# Patient Record
Sex: MALE | Race: WHITE | Employment: OTHER | URBAN - METROPOLITAN AREA
[De-identification: names, ages, dates, MRNs, and addresses within clinical notes are randomized per-mention and may not be internally consistent; named-entity substitution may affect disease eponyms.]

---

## 2018-01-29 ENCOUNTER — OFFICE VISIT (OUTPATIENT)
Dept: INTERNAL MEDICINE CLINIC | Age: 58
End: 2018-01-29

## 2018-01-29 VITALS
WEIGHT: 264 LBS | TEMPERATURE: 97.5 F | DIASTOLIC BLOOD PRESSURE: 54 MMHG | SYSTOLIC BLOOD PRESSURE: 100 MMHG | BODY MASS INDEX: 37.8 KG/M2 | HEART RATE: 92 BPM | HEIGHT: 70 IN | RESPIRATION RATE: 16 BRPM | OXYGEN SATURATION: 98 %

## 2018-01-29 DIAGNOSIS — E66.9 OBESITY (BMI 30-39.9): ICD-10-CM

## 2018-01-29 DIAGNOSIS — F79 MENTAL RETARDATION: ICD-10-CM

## 2018-01-29 DIAGNOSIS — Z00.00 ROUTINE GENERAL MEDICAL EXAMINATION AT A HEALTH CARE FACILITY: Primary | ICD-10-CM

## 2018-01-29 NOTE — PROGRESS NOTES
Chief Complaint   Patient presents with   [facility] Establish Care     Yearly physical and establish care with Dr. Pj Johnson. ROOM 10       Health Maintenance Due   Topic Date Due    Hepatitis C Screening  1960    DTaP/Tdap/Td series (1 - Tdap) 12/25/1981    FOBT Q 1 YEAR AGE 50-75  12/25/2010    Influenza Age 5 to Adult  08/01/2017     Patient's mother states he has had his influenza vaccine at [facility] in November 2017. 1. Have you been to the ER, urgent care clinic or hospitalized since your last visit? NO.     2. Have you seen or consulted any other health care providers outside of the [address] [place] [address] since your last visit (Include any pap smears or colon screening)? YES, Patient's mother states he is seen by a physiatrist every 3 months. Do you have an Advanced Directive? NO    Would you like information on Advanced Directives?  NO

## 2018-01-29 NOTE — PROGRESS NOTES
Jeff Power 1960, is a 62 y.o. male, who is seen today for a routine physical exam having not been seen here for more than 4 years. He lives with his mother and has mental retardation. He does chores every day and is pleasant at home. He sees a psychiatrist for continued use of medication. He has not been able to cut back and lose weight, he has lost weight down to about 235 but is now back up to where he was 4 years ago with another 6 pounds beyond that. His mother thinks he is doing great,  he feels well. Past Medical History:   Diagnosis Date    Mental retardation     Overweight(278.02)      History reviewed. No pertinent surgical history. Current Outpatient Prescriptions   Medication Sig Dispense Refill    thioridazine (MELLARIL) 50 mg tablet 3 tablets in the morning and 2 tablets in the evening 150 Tab 0    amitriptyline (ELAVIL) 100 mg tablet Take 1 Tab by mouth nightly. 30 Tab 0     No Known Allergies  Social History     Social History    Marital status: SINGLE     Spouse name: N/A    Number of children: N/A    Years of education: N/A     Social History Main Topics    Smoking status: Never Smoker    Smokeless tobacco: Never Used    Alcohol use No    Drug use: No    Sexual activity: No     Other Topics Concern    None     Social History Narrative     Visit Vitals    /54 (BP 1 Location: Left arm, BP Patient Position: Sitting)    Pulse 92    Temp 97.5 °F (36.4 °C) (Oral)    Resp 16    Ht 5' 10\" (1.778 m)    Wt 264 lb (119.7 kg)    SpO2 98%    BMI 37.88 kg/m2     Ear canals and tympanic membranes appear normal.  Oral cavity reveals no lesions. Neck reveals no adenopathy or thyromegaly. Carotids are 2+ without bruits. Lungs are clear to percussion. Good breath sounds with no wheezing or crackles. Heart reveals a regular rhythm with normal S1 and S2 no murmur gallop click or rub. Apical impulse is not palpable.   Abdomen is obese soft nontender with no obvious hepatosplenomegaly or masses. No bruits. Extremities reveal no clubbing cyanosis or edema. Pulses are 2+. Lab at the psychiatric office is normal.    Assessment: Mental retardation but doing well otherwise. He is obese but suffering no complications of obesity. I have recommended he work on losing perhaps 30 pounds over the next year or so. He has been able to do that in the past.  He and his mother will be working on that. Follow-up as needed    Ga Viveros. Stef Lindsay MD FACP    Please note: This document has been produced using voice recognition software. Unrecognized errors in transcription may be present.

## 2018-09-01 ENCOUNTER — APPOINTMENT (OUTPATIENT)
Dept: CT IMAGING | Age: 58
End: 2018-09-01
Attending: PHYSICIAN ASSISTANT
Payer: MEDICARE

## 2018-09-01 ENCOUNTER — HOSPITAL ENCOUNTER (EMERGENCY)
Age: 58
Discharge: ANOTHER HOSPITAL | End: 2018-09-01
Attending: EMERGENCY MEDICINE | Admitting: EMERGENCY MEDICINE
Payer: MEDICARE

## 2018-09-01 ENCOUNTER — APPOINTMENT (OUTPATIENT)
Dept: GENERAL RADIOLOGY | Age: 58
End: 2018-09-01
Attending: EMERGENCY MEDICINE
Payer: MEDICARE

## 2018-09-01 VITALS
OXYGEN SATURATION: 97 % | TEMPERATURE: 98.8 F | BODY MASS INDEX: 37.45 KG/M2 | SYSTOLIC BLOOD PRESSURE: 119 MMHG | HEART RATE: 86 BPM | WEIGHT: 261 LBS | RESPIRATION RATE: 29 BRPM | DIASTOLIC BLOOD PRESSURE: 66 MMHG

## 2018-09-01 DIAGNOSIS — I26.99 OTHER ACUTE PULMONARY EMBOLISM WITHOUT ACUTE COR PULMONALE (HCC): ICD-10-CM

## 2018-09-01 DIAGNOSIS — R56.1 POST TRAUMATIC SEIZURE (HCC): ICD-10-CM

## 2018-09-01 DIAGNOSIS — R77.8 ELEVATED TROPONIN: ICD-10-CM

## 2018-09-01 DIAGNOSIS — R55 SYNCOPE AND COLLAPSE: Primary | ICD-10-CM

## 2018-09-01 DIAGNOSIS — S06.329A: ICD-10-CM

## 2018-09-01 LAB
ABO + RH BLD: NORMAL
ALBUMIN SERPL-MCNC: 3.3 G/DL (ref 3.4–5)
ALBUMIN SERPL-MCNC: 3.5 G/DL (ref 3.4–5)
ALBUMIN/GLOB SERPL: 0.9 {RATIO} (ref 0.8–1.7)
ALBUMIN/GLOB SERPL: 0.9 {RATIO} (ref 0.8–1.7)
ALP SERPL-CCNC: 113 U/L (ref 45–117)
ALP SERPL-CCNC: 120 U/L (ref 45–117)
ALT SERPL-CCNC: 61 U/L (ref 16–61)
ALT SERPL-CCNC: 62 U/L (ref 16–61)
ANION GAP SERPL CALC-SCNC: 11 MMOL/L (ref 3–18)
ANION GAP SERPL CALC-SCNC: 8 MMOL/L (ref 3–18)
APPEARANCE UR: CLEAR
APTT PPP: 23.4 SEC (ref 23–36.4)
ARTERIAL PATENCY WRIST A: ABNORMAL
AST SERPL-CCNC: 23 U/L (ref 15–37)
AST SERPL-CCNC: 26 U/L (ref 15–37)
BASE DEFICIT BLD-SCNC: 1 MMOL/L
BASOPHILS # BLD: 0 K/UL (ref 0–0.06)
BASOPHILS NFR BLD: 0 % (ref 0–3)
BDY SITE: ABNORMAL
BILIRUB SERPL-MCNC: 0.4 MG/DL (ref 0.2–1)
BILIRUB SERPL-MCNC: 0.5 MG/DL (ref 0.2–1)
BILIRUB UR QL: NEGATIVE
BLOOD GROUP ANTIBODIES SERPL: NORMAL
BODY TEMPERATURE: 37.1
BUN SERPL-MCNC: 15 MG/DL (ref 7–18)
BUN SERPL-MCNC: 16 MG/DL (ref 7–18)
BUN/CREAT SERPL: 13 (ref 12–20)
BUN/CREAT SERPL: 14 (ref 12–20)
CALCIUM SERPL-MCNC: 8.6 MG/DL (ref 8.5–10.1)
CALCIUM SERPL-MCNC: 9 MG/DL (ref 8.5–10.1)
CHLORIDE SERPL-SCNC: 103 MMOL/L (ref 100–108)
CHLORIDE SERPL-SCNC: 104 MMOL/L (ref 100–108)
CK MB CFR SERPL CALC: 1.7 % (ref 0–4)
CK MB CFR SERPL CALC: 2.1 % (ref 0–4)
CK MB SERPL-MCNC: 1 NG/ML (ref 5–25)
CK MB SERPL-MCNC: 1.5 NG/ML (ref 5–25)
CK SERPL-CCNC: 58 U/L (ref 39–308)
CK SERPL-CCNC: 70 U/L (ref 39–308)
CO2 SERPL-SCNC: 24 MMOL/L (ref 21–32)
CO2 SERPL-SCNC: 27 MMOL/L (ref 21–32)
COLOR UR: YELLOW
CREAT SERPL-MCNC: 1.12 MG/DL (ref 0.6–1.3)
CREAT SERPL-MCNC: 1.16 MG/DL (ref 0.6–1.3)
DIFFERENTIAL METHOD BLD: ABNORMAL
EOSINOPHIL # BLD: 0.2 K/UL (ref 0–0.4)
EOSINOPHIL NFR BLD: 1 % (ref 0–5)
ERYTHROCYTE [DISTWIDTH] IN BLOOD BY AUTOMATED COUNT: 12.3 % (ref 11.6–14.5)
ERYTHROCYTE [DISTWIDTH] IN BLOOD BY AUTOMATED COUNT: 12.3 % (ref 11.6–14.5)
GAS FLOW.O2 O2 DELIVERY SYS: ABNORMAL L/MIN
GLOBULIN SER CALC-MCNC: 3.7 G/DL (ref 2–4)
GLOBULIN SER CALC-MCNC: 3.8 G/DL (ref 2–4)
GLUCOSE SERPL-MCNC: 130 MG/DL (ref 74–99)
GLUCOSE SERPL-MCNC: 136 MG/DL (ref 74–99)
GLUCOSE UR STRIP.AUTO-MCNC: NEGATIVE MG/DL
HCO3 BLD-SCNC: 23.2 MMOL/L (ref 22–26)
HCT VFR BLD AUTO: 35.5 % (ref 36–48)
HCT VFR BLD AUTO: 38.8 % (ref 36–48)
HGB BLD-MCNC: 12.4 G/DL (ref 13–16)
HGB BLD-MCNC: 13.4 G/DL (ref 13–16)
HGB UR QL STRIP: NEGATIVE
INR PPP: 1.1 (ref 0.8–1.2)
KETONES UR QL STRIP.AUTO: ABNORMAL MG/DL
LEUKOCYTE ESTERASE UR QL STRIP.AUTO: NEGATIVE
LYMPHOCYTES # BLD: 2.3 K/UL (ref 0.8–3.5)
LYMPHOCYTES NFR BLD: 14 % (ref 20–51)
MAGNESIUM SERPL-MCNC: 2.3 MG/DL (ref 1.6–2.6)
MCH RBC QN AUTO: 29.9 PG (ref 24–34)
MCH RBC QN AUTO: 30 PG (ref 24–34)
MCHC RBC AUTO-ENTMCNC: 34.5 G/DL (ref 31–37)
MCHC RBC AUTO-ENTMCNC: 34.9 G/DL (ref 31–37)
MCV RBC AUTO: 86 FL (ref 74–97)
MCV RBC AUTO: 86.6 FL (ref 74–97)
MONOCYTES # BLD: 1.7 K/UL (ref 0–1)
MONOCYTES NFR BLD: 10 % (ref 2–9)
NEUTS BAND NFR BLD MANUAL: 2 % (ref 0–5)
NEUTS SEG # BLD: 12.3 K/UL (ref 1.8–8)
NEUTS SEG NFR BLD: 73 % (ref 42–75)
NITRITE UR QL STRIP.AUTO: NEGATIVE
O2/TOTAL GAS SETTING VFR VENT: 21 %
PCO2 BLD: 37.2 MMHG (ref 35–45)
PH BLD: 7.4 [PH] (ref 7.35–7.45)
PH UR STRIP: 7 [PH] (ref 5–8)
PLATELET # BLD AUTO: 116 K/UL (ref 135–420)
PLATELET # BLD AUTO: 154 K/UL (ref 135–420)
PLATELET COMMENTS,PCOM: ABNORMAL
PMV BLD AUTO: 10.4 FL (ref 9.2–11.8)
PMV BLD AUTO: 11.2 FL (ref 9.2–11.8)
PO2 BLD: 58 MMHG (ref 80–100)
POTASSIUM SERPL-SCNC: 4.4 MMOL/L (ref 3.5–5.5)
POTASSIUM SERPL-SCNC: 4.6 MMOL/L (ref 3.5–5.5)
PROT SERPL-MCNC: 7 G/DL (ref 6.4–8.2)
PROT SERPL-MCNC: 7.3 G/DL (ref 6.4–8.2)
PROT UR STRIP-MCNC: NEGATIVE MG/DL
PROTHROMBIN TIME: 14 SEC (ref 11.5–15.2)
RBC # BLD AUTO: 4.13 M/UL (ref 4.7–5.5)
RBC # BLD AUTO: 4.48 M/UL (ref 4.7–5.5)
RBC MORPH BLD: ABNORMAL
RBC MORPH BLD: ABNORMAL
SAO2 % BLD: 90 % (ref 92–97)
SERVICE CMNT-IMP: ABNORMAL
SODIUM SERPL-SCNC: 138 MMOL/L (ref 136–145)
SODIUM SERPL-SCNC: 139 MMOL/L (ref 136–145)
SP GR UR REFRACTOMETRY: >1.03 (ref 1–1.03)
SPECIMEN EXP DATE BLD: NORMAL
SPECIMEN TYPE: ABNORMAL
TOTAL RESP. RATE, ITRR: 23
TROPONIN I SERPL-MCNC: 0.04 NG/ML (ref 0–0.04)
TROPONIN I SERPL-MCNC: 0.12 NG/ML (ref 0–0.04)
UROBILINOGEN UR QL STRIP.AUTO: 0.2 EU/DL (ref 0.2–1)
WBC # BLD AUTO: 16.5 K/UL (ref 4.6–13.2)
WBC # BLD AUTO: 7.5 K/UL (ref 4.6–13.2)

## 2018-09-01 PROCEDURE — 96367 TX/PROPH/DG ADDL SEQ IV INF: CPT

## 2018-09-01 PROCEDURE — 77030018836 HC SOL IRR NACL ICUM -A

## 2018-09-01 PROCEDURE — 86900 BLOOD TYPING SEROLOGIC ABO: CPT | Performed by: PHYSICIAN ASSISTANT

## 2018-09-01 PROCEDURE — 74011250636 HC RX REV CODE- 250/636: Performed by: PHYSICIAN ASSISTANT

## 2018-09-01 PROCEDURE — 83735 ASSAY OF MAGNESIUM: CPT | Performed by: PHYSICIAN ASSISTANT

## 2018-09-01 PROCEDURE — 80053 COMPREHEN METABOLIC PANEL: CPT | Performed by: EMERGENCY MEDICINE

## 2018-09-01 PROCEDURE — 36600 WITHDRAWAL OF ARTERIAL BLOOD: CPT

## 2018-09-01 PROCEDURE — 85730 THROMBOPLASTIN TIME PARTIAL: CPT | Performed by: PHYSICIAN ASSISTANT

## 2018-09-01 PROCEDURE — 93005 ELECTROCARDIOGRAM TRACING: CPT

## 2018-09-01 PROCEDURE — 77030008460 HC STPLR SKN PRECIS 3M -A

## 2018-09-01 PROCEDURE — 82550 ASSAY OF CK (CPK): CPT | Performed by: EMERGENCY MEDICINE

## 2018-09-01 PROCEDURE — 85025 COMPLETE CBC W/AUTO DIFF WBC: CPT | Performed by: PHYSICIAN ASSISTANT

## 2018-09-01 PROCEDURE — 75810000293 HC SIMP/SUPERF WND  RPR

## 2018-09-01 PROCEDURE — 85027 COMPLETE CBC AUTOMATED: CPT | Performed by: EMERGENCY MEDICINE

## 2018-09-01 PROCEDURE — 71045 X-RAY EXAM CHEST 1 VIEW: CPT

## 2018-09-01 PROCEDURE — 74011636320 HC RX REV CODE- 636/320: Performed by: EMERGENCY MEDICINE

## 2018-09-01 PROCEDURE — 81003 URINALYSIS AUTO W/O SCOPE: CPT | Performed by: EMERGENCY MEDICINE

## 2018-09-01 PROCEDURE — 80053 COMPREHEN METABOLIC PANEL: CPT | Performed by: PHYSICIAN ASSISTANT

## 2018-09-01 PROCEDURE — 82803 BLOOD GASES ANY COMBINATION: CPT

## 2018-09-01 PROCEDURE — 71275 CT ANGIOGRAPHY CHEST: CPT

## 2018-09-01 PROCEDURE — 96365 THER/PROPH/DIAG IV INF INIT: CPT

## 2018-09-01 PROCEDURE — 70450 CT HEAD/BRAIN W/O DYE: CPT

## 2018-09-01 PROCEDURE — 85610 PROTHROMBIN TIME: CPT | Performed by: PHYSICIAN ASSISTANT

## 2018-09-01 PROCEDURE — 99285 EMERGENCY DEPT VISIT HI MDM: CPT

## 2018-09-01 RX ORDER — HEPARIN SODIUM 10000 [USP'U]/100ML
18-36 INJECTION, SOLUTION INTRAVENOUS
Status: DISCONTINUED | OUTPATIENT
Start: 2018-09-01 | End: 2018-09-02 | Stop reason: HOSPADM

## 2018-09-01 RX ORDER — HEPARIN SODIUM 1000 [USP'U]/ML
80 INJECTION, SOLUTION INTRAVENOUS; SUBCUTANEOUS ONCE
Status: COMPLETED | OUTPATIENT
Start: 2018-09-01 | End: 2018-09-01

## 2018-09-01 RX ORDER — LEVETIRACETAM 5 MG/ML
1000 INJECTION INTRAVASCULAR EVERY 12 HOURS
Status: DISCONTINUED | OUTPATIENT
Start: 2018-09-01 | End: 2018-09-02 | Stop reason: HOSPADM

## 2018-09-01 RX ADMIN — IOPAMIDOL 50 ML: 755 INJECTION, SOLUTION INTRAVENOUS at 16:31

## 2018-09-01 RX ADMIN — HEPARIN SODIUM AND DEXTROSE 18 UNITS/KG/HR: 10000; 5 INJECTION INTRAVENOUS at 19:27

## 2018-09-01 RX ADMIN — LEVETIRACETAM 1000 MG: 5 INJECTION INTRAVENOUS at 13:45

## 2018-09-01 RX ADMIN — SODIUM CHLORIDE 1000 ML: 900 INJECTION, SOLUTION INTRAVENOUS at 13:45

## 2018-09-01 RX ADMIN — HEPARIN SODIUM 9470 UNITS: 1000 INJECTION INTRAVENOUS; SUBCUTANEOUS at 19:25

## 2018-09-01 NOTE — DISCHARGE INSTRUCTIONS
Cuts: Care Instructions  Your Care Instructions  A cut can happen anywhere on your body. Stitches, staples, skin adhesives, or pieces of tape called Steri-Strips are sometimes used to keep the edges of a cut together and help it heal. Steri-Strips can be used by themselves or with stitches or staples. Sometimes cuts are left open. If the cut went deep and through the skin, the doctor may have closed the cut in two layers. A deeper layer of stitches brings the deep part of the cut together. These stitches will dissolve and don't need to be removed. The upper layer closure, which could be stitches, staples, Steri-Strips, or adhesive, is what you see on the cut. A cut is often covered by a bandage. The doctor has checked you carefully, but problems can develop later. If you notice any problems or new symptoms, get medical treatment right away. Follow-up care is a key part of your treatment and safety. Be sure to make and go to all appointments, and call your doctor if you are having problems. It's also a good idea to know your test results and keep a list of the medicines you take. How can you care for yourself at home? If a cut is open or closed  · Prop up the sore area on a pillow anytime you sit or lie down during the next 3 days. Try to keep it above the level of your heart. This will help reduce swelling. · Keep the cut dry for the first 24 to 48 hours. After this, you can shower if your doctor okays it. Pat the cut dry. · Don't soak the cut, such as in a bathtub. Your doctor will tell you when it's safe to get the cut wet. · After the first 24 to 48 hours, clean the cut with soap and water 2 times a day unless your doctor gives you different instructions. ¨ Don't use hydrogen peroxide or alcohol, which can slow healing. ¨ You may cover the cut with a thin layer of petroleum jelly and a nonstick bandage.   ¨ If the doctor put a bandage over the cut, put on a new bandage after cleaning the cut or if the bandage gets wet or dirty. · Avoid any activity that could cause your cut to reopen. · Be safe with medicines. Read and follow all instructions on the label. ¨ If the doctor gave you a prescription medicine for pain, take it as prescribed. ¨ If you are not taking a prescription pain medicine, ask your doctor if you can take an over-the-counter medicine. If the cut is closed with stitches, staples, or Steri-Strips  · Follow the above instructions for open or closed cuts. · Do not remove the stitches or staples on your own. Your doctor will tell you when to come back to have the stitches or staples removed. · Leave Steri-Strips on until they fall off. If the cut is closed with a skin adhesive  · Follow the above instructions for open or closed cuts. · Leave the skin adhesive on your skin until it falls off on its own. This may take 5 to 10 days. · Do not scratch, rub, or pick at the adhesive. · Do not put the sticky part of a bandage directly on the adhesive. · Do not put any kind of ointment, cream, or lotion over the area. This can make the adhesive fall off too soon. Do not use hydrogen peroxide or alcohol, which can slow healing. When should you call for help? Call your doctor now or seek immediate medical care if:    · You have new pain, or your pain gets worse.     · The skin near the cut is cold or pale or changes color.     · You have tingling, weakness, or numbness near the cut.     · The cut starts to bleed, and blood soaks through the bandage. Oozing small amounts of blood is normal.     · You have trouble moving the area near the cut.     · You have symptoms of infection, such as:  ¨ Increased pain, swelling, warmth, or redness around the cut. ¨ Red streaks leading from the cut. ¨ Pus draining from the cut. ¨ A fever.    Watch closely for changes in your health, and be sure to contact your doctor if:    · The cut reopens.     · You do not get better as expected.    Where can you learn more? Go to http://severino-curtis.info/. Enter M735 in the search box to learn more about \"Cuts: Care Instructions. \"  Current as of: November 20, 2017  Content Version: 11.7  © 2360-0916 [employer]. Care instructions adapted under license by [employer] (which disclaims liability or warranty for this information). If you have questions about a medical condition or this instruction, always ask your healthcare professional. [address] any warranty or liability for your use of this information.

## 2018-09-01 NOTE — ED NOTES
Patient able to stand and ambulate with no complications. Pt had laceration covered with appropriate bandage. Pt and family given d./c papers and had no questions @ this time.

## 2018-09-01 NOTE — ED NOTES
TRANSFER - OUT REPORT: 
 
Verbal report given to Sharlet Rubinstein, RN(name) on Marivel Flynn  being transferred to [facility]) for routine progression of care Report consisted of patients Situation, Background, Assessment and  
Recommendations(SBAR). Information from the following report(s) SBAR, ED Summary, Intake/Output, MAR, Recent Results and Cardiac Rhythm NSR was reviewed with the receiving nurse. Opportunity for questions and clarification was provided. Patient transported with: 
[facility] transport

## 2018-09-01 NOTE — ED TRIAGE NOTES
Pt. Arrived via medic. Pt walking outside and got dizzy, fell. Pt has laceration on R side of head. Controlled bleeding @ this time. Pt AO x4. Hx of MR.

## 2018-09-01 NOTE — ED PROVIDER NOTES
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 Details not available. PMHx Several falls in past. 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 Meds Elavil HS, Mellaril 3 in AM 2 HS Patient is a 62 y.o. male presenting with fall. The history is provided by the patient and a parent. The history is limited by a developmental delay. Fall The accident occurred less than 1 hour ago. The fall occurred while standing and while walking. He landed on concrete. Past Medical History:  
Diagnosis Date  Mental retardation  Overweight(278.02) History reviewed. No pertinent surgical history. History reviewed. No pertinent family history. Social History Social History  Marital status: SINGLE Spouse name: N/A  
 Number of children: N/A  
 Years of education: N/A Occupational History  Not on file. Social History Main Topics  Smoking status: Never Smoker  Smokeless tobacco: Never Used  Alcohol use No  
 Drug use: No  
 Sexual activity: No  
 
Other Topics Concern  Not on file Social History Narrative ALLERGIES: Review of patient's allergies indicates no known allergies. Review of Systems Constitutional: Negative. HENT: Negative. Laceration forehead. Eyes: Negative. Respiratory: Negative. Cardiovascular: Negative. Gastrointestinal: Negative. Endocrine: Negative. Genitourinary: Negative. Musculoskeletal: Negative. Skin: Negative. Allergic/Immunologic: Negative. Neurological: Negative. Hematological: Negative. Psychiatric/Behavioral: Negative. Vitals:  
 09/01/18 1035 09/01/18 1036 09/01/18 1040 09/01/18 1040 BP:      
Pulse: (!) 105 (!) 103 Resp: 23 22 Temp:    98.8 °F (37.1 °C) SpO2: (!) 89% (!) 89% 93% Physical Exam  
Constitutional: He is oriented to person, place, and time. He appears well-developed and well-nourished. No distress. HENT:  
Head: Normocephalic. Two small lacerations forehead. JAS EOM's OK Eyes: Conjunctivae and EOM are normal. Pupils are equal, round, and reactive to light. Right eye exhibits no discharge. Left eye exhibits no discharge. No scleral icterus. Neck: Normal range of motion. No JVD present. No tracheal deviation present. No thyromegaly present. Cardiovascular: Normal rate, regular rhythm and normal heart sounds. No murmur heard. Pulmonary/Chest: Effort normal. No stridor. No respiratory distress. He has wheezes. He has no rales. He exhibits no tenderness. Remote Hx asthma. Abdominal: Soft. Bowel sounds are normal. He exhibits no distension and no mass. There is no tenderness. There is no rebound and no guarding. Musculoskeletal: Normal range of motion. He exhibits no edema, tenderness or deformity. Lymphadenopathy:  
  He has no cervical adenopathy. Neurological: He is oriented to person, place, and time. No cranial nerve deficit. Coordination normal.  
Skin: No rash noted. He is not diaphoretic. No erythema. No pallor. Psychiatric: He has a normal mood and affect. MDM Number of Diagnoses or Management Options Birth injury to central nervous system:  
Facial laceration, initial encounter:  
Fall, initial encounter:  
Diagnosis management comments: Imp: Unknown faint vs fall?? Prior falls by family Hx. ED Course Wound Repair 
Date/Time: 9/1/2018 11:05 AM 
Performed by: attendingPreparation: skin prepped with Betadine Location details: face Wound length:2.5 cm or less Anesthesia: local infiltration Anesthesia: 
Local Anesthetic: lidocaine 1% without epinephrine Anesthetic total: 5 mL Foreign bodies: no foreign bodies Irrigation solution: saline Irrigation method: syringe Debridement: none Skin closure: staples (Stap(les #$) Wound subcutaneous closure material used: none. Number of sutures: 7 (# staples) Technique: simple Approximation: close Dressing: antibiotic ointment and gauze roll My total time at bedside, performing this procedure was 1-15 minutes. EKG RSR rate 108 Normal axis, Intervals and S seg. Recent Results (from the past 12 hour(s)) CARDIAC PANEL,(CK, CKMB & TROPONIN) Collection Time: 09/01/18 11:00 AM  
Result Value Ref Range CK 58 39 - 308 U/L  
 CK - MB 1.0 <3.6 ng/ml CK-MB Index 1.7 0.0 - 4.0 % Troponin-I, Qt. 0.04 0.0 - 0.045 NG/ML  
CBC W/O DIFF Collection Time: 09/01/18 11:00 AM  
Result Value Ref Range WBC 7.5 4.6 - 13.2 K/uL  
 RBC 4.13 (L) 4.70 - 5.50 M/uL  
 HGB 12.4 (L) 13.0 - 16.0 g/dL HCT 35.5 (L) 36.0 - 48.0 % MCV 86.0 74.0 - 97.0 FL  
 MCH 30.0 24.0 - 34.0 PG  
 MCHC 34.9 31.0 - 37.0 g/dL  
 RDW 12.3 11.6 - 14.5 % PLATELET 774 (L) 203 - 420 K/uL MPV 10.4 9.2 - 43.8 FL  
METABOLIC PANEL, COMPREHENSIVE Collection Time: 09/01/18 11:00 AM  
Result Value Ref Range Sodium 138 136 - 145 mmol/L Potassium 4.4 3.5 - 5.5 mmol/L Chloride 103 100 - 108 mmol/L  
 CO2 27 21 - 32 mmol/L Anion gap 8 3.0 - 18 mmol/L Glucose 130 (H) 74 - 99 mg/dL BUN 16 7.0 - 18 MG/DL Creatinine 1.12 0.6 - 1.3 MG/DL  
 BUN/Creatinine ratio 14 12 - 20 GFR est AA >60 >60 ml/min/1.73m2 GFR est non-AA >60 >60 ml/min/1.73m2 Calcium 8.6 8.5 - 10.1 MG/DL Bilirubin, total 0.4 0.2 - 1.0 MG/DL  
 ALT (SGPT) 61 16 - 61 U/L  
 AST (SGOT) 23 15 - 37 U/L Alk. phosphatase 113 45 - 117 U/L Protein, total 7.0 6.4 - 8.2 g/dL Albumin 3.3 (L) 3.4 - 5.0 g/dL Globulin 3.7 2.0 - 4.0 g/dL A-G Ratio 0.9 0.8 - 1.7 Medications ordered:  
Medications - No data to display Follow up with family MD - Staple removal one week. 1. Fall, initial encounter 2. Birth injury to central nervous system 3. Facial laceration, initial encounter

## 2018-09-01 NOTE — ED NOTES
Per the patient's parent, \"we were walking to the car. He said that he felt dizzy and then started going down to the ground. He didn't fall. When the nurse got out there with the wheelchair, he went completely out. His eyes rolled to the back of his head. \"  The patient was brought back to room 4 and assisted on the stretcher. The patient was incontinent of urine.

## 2018-09-01 NOTE — ED NOTES
EMERGENCY DEPARTMENT HISTORY AND PHYSICAL EXAM 
 
Date: 9/1/2018 Patient Name: Jolly Mederos History of Presenting Illness Chief Complaint Patient presents with  Fall History Provided By: Patient's Father and Patient's Mother Chief Complaint: dizziness, passing out Duration: just a few minutes ago Timing:  Acute Quality: N/A Severity: N/A Modifying Factors: was just discharged from this ER after a fall and has 1 staple placed to the forehead Associated Symptoms: had a fall and possible similar episode earlier this morning, + dizziness, + syncope, no tremors or generalized seizure activity per parents, no fevers, no chills Additional History (Context): Jolly Mederos is a 62 y.o. male with intellectual delay and obesity who presents with C/O of an acute episode of dizziness, syncope while walking to his car after being discharged from the ER today. Patient is unable to give a lot of history due to his current state. Mom states that this morning the medics were called when their neighbors found him down on the road with a head laceration. He had an unwitnessed fall. He was brought here by medics and evaluated for a fall. He had labs, chest XR, EKG, and was acting his normal self. He had a staple placed to his left forehead. He was feeling like himself and was discharged home. As he and his parents were walking out to their car, he began to feel dizzy. His parents states that he then began to get weak and started to go limp in their arms. They had him sit down on the ground and his father came to get help. Triage RN and scribe went out to the parking lot while the  informed myself (JEREMÍAS Whiting) as well as two other providers. As provider staff got out to the patient, he was being lifted into a wheelchair. He was not responsive, had his mouth agape, and his eyes were not focused.   It was noted that he had urinary incontinence. He did have a carotid pulse. He was moved to bed 4 where he was transferred with the help of several staff members to a stretcher. He continued to be nonresponsive and he still had a carotid pulse. He had an initial BP of 172/111. He was supported briefly by Ambu bag for his respirations, because he had snoring respirations. After about 5-10 minutes, he started to wake up, called out for his mother. About 20 minutes after this episode, patient returned to his baseline mentation which is normally delayed because of his ID. PCP: Afshan Mtz MD 
 
Current Facility-Administered Medications Medication Dose Route Frequency Provider Last Rate Last Dose  levETIRAcetam (KEPPRA) 500 mg in saline (iso-osm) 100 ml IVPB  1,000 mg IntraVENous Q12H Delisa [place], [address]   Stopped at 09/01/18 1401  heparin 25,000 units in D5W 250 ml infusion  18-36 Units/kg/hr IntraVENous TITRATE Delisa [place], [address] 21.3 mL/hr at 09/01/18 1927 18 Units/kg/hr at 09/01/18 1927 Current Outpatient Prescriptions Medication Sig Dispense Refill  thioridazine (MELLARIL) 50 mg tablet 3 tablets in the morning and 2 tablets in the evening 150 Tab 0  
 amitriptyline (ELAVIL) 100 mg tablet Take 1 Tab by mouth nightly. 30 Tab 0 Past History Past Medical History: 
Past Medical History:  
Diagnosis Date  Mental retardation  Overweight(278.02) Past Surgical History: 
History reviewed. No pertinent surgical history. Family History: 
History reviewed. No pertinent family history. Social History: 
Social History Substance Use Topics  Smoking status: Never Smoker  Smokeless tobacco: Never Used  Alcohol use No  
 
 
Allergies: 
No Known Allergies Review of Systems Review of Systems Unable to perform ROS: Acuity of condition Physical Exam  
 
Vitals:  
 09/01/18 1845 09/01/18 1900 09/01/18 1901 09/01/18 1905 BP: 109/83 123/86 Pulse: 84 85 Resp: 18 18 Temp:      
SpO2: 96% 96% Weight:   113.4 kg (250 lb) 118.4 kg (261 lb) Physical Exam  
Constitutional: He appears well-developed and well-nourished. No distress. HENT:  
Head: Normocephalic. Freshly repaired laceration to the left forehead with 1 staple in the forehead. No new abrasions or head injury. Eyes: EOM are normal. Pupils are equal, round, and reactive to light. Neck: Neck supple. Cardiovascular: Normal rate and regular rhythm. Exam reveals no gallop and no friction rub. No murmur heard. No leg swelling, no pitting edema Pulmonary/Chest: Effort normal and breath sounds normal. No respiratory distress. He has no wheezes. He has no rales. Abdominal: Soft. Bowel sounds are normal. He exhibits no distension and no mass. There is no tenderness. There is no rebound and no guarding. Musculoskeletal: Normal range of motion. He exhibits no tenderness or deformity. Lymphadenopathy:  
  He has no cervical adenopathy. Neurological:  
Initially patient with incontinent of urine, not particular responsive, and appeared post ictal.  He started to come around about 5-10 minutes after, became slightly combative but was able to be redirected and started to follow commands. Once he was acting more normally and could speak to staff he had CN 2-12 intact, no pronator drift, no leg drift, no facial droop, his father states his speech was at baseline which is slightly slurred. He had rotatory nystagmus,  He had normal finger to nose bilaterally, he was oriented to person, place and time. Skin: Skin is warm and dry. He is not diaphoretic.  
+ abrasion to the left shoulder Psychiatric: He has a normal mood and affect. His behavior is normal.  
Nursing note and vitals reviewed. Diagnostic Study Results Labs - Recent Results (from the past 12 hour(s)) EKG, 12 LEAD, INITIAL Collection Time: 09/01/18 10:34 AM  
Result Value Ref Range Ventricular Rate 108 BPM  
 Atrial Rate 108 BPM  
 P-R Interval 172 ms QRS Duration 114 ms Q-T Interval 370 ms QTC Calculation (Bezet) 495 ms Calculated P Axis 25 degrees Calculated R Axis 64 degrees Calculated T Axis 27 degrees Diagnosis Sinus tachycardia Otherwise normal ECG When compared with ECG of 13-AUG-2009 16:18, Incomplete right bundle branch block is no longer present CARDIAC PANEL,(CK, CKMB & TROPONIN) Collection Time: 09/01/18 11:00 AM  
Result Value Ref Range CK 58 39 - 308 U/L  
 CK - MB 1.0 <3.6 ng/ml CK-MB Index 1.7 0.0 - 4.0 % Troponin-I, Qt. 0.04 0.0 - 0.045 NG/ML  
CBC W/O DIFF Collection Time: 09/01/18 11:00 AM  
Result Value Ref Range WBC 7.5 4.6 - 13.2 K/uL  
 RBC 4.13 (L) 4.70 - 5.50 M/uL  
 HGB 12.4 (L) 13.0 - 16.0 g/dL HCT 35.5 (L) 36.0 - 48.0 % MCV 86.0 74.0 - 97.0 FL  
 MCH 30.0 24.0 - 34.0 PG  
 MCHC 34.9 31.0 - 37.0 g/dL  
 RDW 12.3 11.6 - 14.5 % PLATELET 127 (L) 611 - 420 K/uL MPV 10.4 9.2 - 57.9 FL  
METABOLIC PANEL, COMPREHENSIVE Collection Time: 09/01/18 11:00 AM  
Result Value Ref Range Sodium 138 136 - 145 mmol/L Potassium 4.4 3.5 - 5.5 mmol/L Chloride 103 100 - 108 mmol/L  
 CO2 27 21 - 32 mmol/L Anion gap 8 3.0 - 18 mmol/L Glucose 130 (H) 74 - 99 mg/dL BUN 16 7.0 - 18 MG/DL Creatinine 1.12 0.6 - 1.3 MG/DL  
 BUN/Creatinine ratio 14 12 - 20 GFR est AA >60 >60 ml/min/1.73m2 GFR est non-AA >60 >60 ml/min/1.73m2 Calcium 8.6 8.5 - 10.1 MG/DL Bilirubin, total 0.4 0.2 - 1.0 MG/DL  
 ALT (SGPT) 61 16 - 61 U/L  
 AST (SGOT) 23 15 - 37 U/L Alk. phosphatase 113 45 - 117 U/L Protein, total 7.0 6.4 - 8.2 g/dL Albumin 3.3 (L) 3.4 - 5.0 g/dL Globulin 3.7 2.0 - 4.0 g/dL A-G Ratio 0.9 0.8 - 1.7    
CBC WITH AUTOMATED DIFF Collection Time: 09/01/18  1:09 PM  
Result Value Ref Range WBC 16.5 (H) 4.6 - 13.2 K/uL  
 RBC 4.48 (L) 4.70 - 5.50 M/uL  
 HGB 13.4 13.0 - 16.0 g/dL HCT 38.8 36.0 - 48.0 % MCV 86.6 74.0 - 97.0 FL  
 MCH 29.9 24.0 - 34.0 PG  
 MCHC 34.5 31.0 - 37.0 g/dL  
 RDW 12.3 11.6 - 14.5 % PLATELET 912 256 - 272 K/uL MPV 11.2 9.2 - 11.8 FL  
 NEUTROPHILS 73 42 - 75 % BAND NEUTROPHILS 2 0 - 5 % LYMPHOCYTES 14 (L) 20 - 51 % MONOCYTES 10 (H) 2 - 9 % EOSINOPHILS 1 0 - 5 % BASOPHILS 0 0 - 3 %  
 ABS. NEUTROPHILS 12.3 (H) 1.8 - 8.0 K/UL  
 ABS. LYMPHOCYTES 2.3 0.8 - 3.5 K/UL  
 ABS. MONOCYTES 1.7 (H) 0 - 1.0 K/UL  
 ABS. EOSINOPHILS 0.2 0.0 - 0.4 K/UL  
 ABS. BASOPHILS 0.0 0.0 - 0.06 K/UL  
 DF MANUAL PLATELET COMMENTS ADEQUATE PLATELETS    
 RBC COMMENTS ANISOCYTOSIS 1+ 
    
 RBC COMMENTS OVALOCYTES 1+ METABOLIC PANEL, COMPREHENSIVE Collection Time: 09/01/18  1:09 PM  
Result Value Ref Range Sodium 139 136 - 145 mmol/L Potassium 4.6 3.5 - 5.5 mmol/L Chloride 104 100 - 108 mmol/L  
 CO2 24 21 - 32 mmol/L Anion gap 11 3.0 - 18 mmol/L Glucose 136 (H) 74 - 99 mg/dL BUN 15 7.0 - 18 MG/DL Creatinine 1.16 0.6 - 1.3 MG/DL  
 BUN/Creatinine ratio 13 12 - 20 GFR est AA >60 >60 ml/min/1.73m2 GFR est non-AA >60 >60 ml/min/1.73m2 Calcium 9.0 8.5 - 10.1 MG/DL Bilirubin, total 0.5 0.2 - 1.0 MG/DL  
 ALT (SGPT) 62 (H) 16 - 61 U/L  
 AST (SGOT) 26 15 - 37 U/L Alk. phosphatase 120 (H) 45 - 117 U/L Protein, total 7.3 6.4 - 8.2 g/dL Albumin 3.5 3.4 - 5.0 g/dL Globulin 3.8 2.0 - 4.0 g/dL A-G Ratio 0.9 0.8 - 1.7 MAGNESIUM Collection Time: 09/01/18  1:09 PM  
Result Value Ref Range Magnesium 2.3 1.6 - 2.6 mg/dL CARDIAC PANEL,(CK, CKMB & TROPONIN) Collection Time: 09/01/18  1:09 PM  
Result Value Ref Range CK 70 39 - 308 U/L  
 CK - MB 1.5 <3.6 ng/ml CK-MB Index 2.1 0.0 - 4.0 % Troponin-I, Qt. 0.12 (H) 0.0 - 0.045 NG/ML  
EKG, 12 LEAD, INITIAL Collection Time: 09/01/18  1:11 PM  
Result Value Ref Range  Ventricular Rate 115 BPM  
 Atrial Rate 115 BPM  
 P-R Interval 166 ms  
 QRS Duration 116 ms  
 Q-T Interval 358 ms QTC Calculation (Bezet) 495 ms Calculated P Axis 32 degrees Calculated R Axis 77 degrees Calculated T Axis 48 degrees Diagnosis Sinus tachycardia Incomplete right bundle branch block Borderline ECG When compared with ECG of 13-AUG-2009 16:18, No significant change was found POC G3 Collection Time: 09/01/18  2:09 PM  
Result Value Ref Range Device: ROOM AIR    
 FIO2 (POC) 21 % pH (POC) 7.403 7.35 - 7.45    
 pCO2 (POC) 37.2 35.0 - 45.0 MMHG  
 pO2 (POC) 58 (L) 80 - 100 MMHG  
 HCO3 (POC) 23.2 22 - 26 MMOL/L  
 sO2 (POC) 90 (L) 92 - 97 % Base deficit (POC) 1 mmol/L Allens test (POC) N/A Total resp. rate 23 Site RIGHT BRACHIAL Patient temp. 37.1 Specimen type (POC) ARTERIAL Performed by Severo Rolls Radiologic Studies -  
CTA CHEST W OR W WO CONT Final Result XR CHEST PORT Final Result CT HEAD WO CONT Final Result CT Results  (Last 48 hours) 09/01/18 1629  CTA [facility] CONT Final result Impression:  IMPRESSION: Heavy burden of bilateral pulmonary emboli. Trace effusions and bilateral probable atelectasis at the bases. Discussed with Deepika Keita at the time of dictation. Narrative:  INDICATION:  Syncope, hypoxia COMPARISON: 9/1/2018. TECHNIQUE:   
Precontrast  images were obtained to localize the volume for acquisition. Multislice helical CT arteriography was performed from the diaphragm to the  
thoracic inlet during uneventful rapid bolus intravenous administration of 50 cc Isovue-370. Lung and soft tissue windows were generated. 3-D MIP Post processing  
was performed and coronal reformatted images were also generated.   
   
FINDINGS:  
   
Multiple filling defects noted in the pulmonary arteries bilaterally starting  
from the distal main left and light pulmonary arteries and subsequent segmental  
 branches. No saddle type embolus. Bilateral lung base dependent change and trace effusions. There is no mediastinal or hilar adenopathy or mass. The aorta enhances normally  
without evidence of aneurysm or dissection. The visualized portions of the upper abdominal organs demonstrate no acute  
process. 09/01/18 1338  CT HEAD WO CONT Final result Impression:  IMPRESSION:  
1. Scalp laceration with staples in the left frontal area. No skull fracture. 2. Suspect small area of parenchymal hemorrhagic contusion at the left frontal  
lobe. No acute findings otherwise. Narrative:  CT head without IV contrast  
   
HISTORY: Syncope versus seizure. Comparison August 13, 2009 All CT scans at this facility are performed using dose optimization technique as  
appropriate to a performed exam, to include automated exposure control,  
adjustment of the mA and/or kV according to patient size (including appropriate  
matching for site specific examination) or use of iterative reconstruction  
technique. No midline shift or extra-axial collection. There is a small focal high density  
at the left frontal lobe, image 23. The hyperdensity is about 4 mm. No  
surrounding edema. Ventricular system is unremarkable. No additional  
intracranial hemorrhage, mass lesions or cortical infarct involving a major  
vessel. Visualized paranasal sinuses and mastoid air cells are clear. Staples are seen in the left frontal scalp. No skull fracture. No radiopaque  
foreign bodies. CXR Results  (Last 48 hours) 09/01/18 1138  XR CHEST PORT Final result Impression:  IMPRESSION: No pneumonia or CHF. Narrative:  Portable CXR:  
   
Comparison August 13, 2009 HISTORY: Dizziness. Cardiac silhouette and vascularity within normal limits. Lungs are hypoinflated  
with bilateral basilar subsegmental atelectasis. No consolidation.  No pleural  
 effusion or pneumothorax. Medical Decision Making I am the first provider for this patient. I reviewed the vital signs, available nursing notes, past medical history, past surgical history, family history and social history. Vital Signs-Reviewed the patient's vital signs. EKG: Interpreted by the EP, and JEREMÍAS Dumont Time Interpreted: 7089 Rate:  115 Rhythm: sinus tachycardia Interpretation: no STEMI, incomplete right bundle Comparison: Morning EKG at 10:34 AM with no change Records Reviewed: Nursing Notes, Old Medical Records and Previous electrocardiograms Procedures: 
Procedures Provider Notes (Medical Decision Making):  
Immediately Dr. Markus Ny, ER attending, was at bedside -- he saw patient this AM and repaired his forehead. He also examined patient. We discussed that we were concerned for seizure because his urinary incontinence and post ictal like behavior. We agreed to obtain EKG, CBC, CMP, Cardiac panel, CT head, ABG, involve teleneuro   Updated parents about the plan and they agree. Karen Valecnia I went with patient to CT to make sure he did not have any other further episodes with tech and RN. He was at his baseline, able to help transfer. While in CT, Dr. Markus Ny spoke with Dr. Shahla Paz, teleneurologist.   She initially states that she thinks patient should be loaded with 1g of Keppra, be admitted, have EEG, and cardiac workup. Once I returned with the patient to the ED, Dr. Shahla Paz, teleneuro, saw and evaluated the patient. She returned her call after seeing patient and asks for admission, seizure and cardiac work up. Will await labs, then admit to hospitalist team. JEREMÍAS Valencia Noted CT reading for small frontal lobe hemorrhagic contusion -- 4 mm. Updated Dr. Markus Ny. Noted ABG with wide gradient.    Patient denies currently any SOB or chest pain, no recent travel, no leg swelling, no hx of DVT or clotting disorder. Parents states he walks every day but the rest of the day is very sedentary. Noted troponin of 0.12 -- rising from prior. HR in the upper 90s but was notably tachycardic when first seen. Also observed, patient was sleeping in room and he was hypoxic at 86%. Parents deny history of TERRENCE.   2 L of O2 via nasal cannula started by this provider, RN Hayde Santos updated. Discussed patient further with Dr. Bernarda Bond -- will obtain chest CTA to eval for PE. Karen Franz Updated family about the results and pending admission, but likely to tertiary care center with trauma given the frontal lobe contusion. Informed them that the patient would need a CTA first to eval for PE. They agree with the plan. Karen Franz Spoke with Dr. Santo Nixon, radiology. He states patient has bilateral heavy burden of PE. No saddle embolus. Karen Franz Discussed further with Dr. Bernarda Bond. We discussed risks/benefits of anticoagulation for patient given his small brain contusion. He would like for me to talk with neurosurgery at [facility]. Page out to transfer center. Karen Franz Discussed patient with JEREMÍAS Villagran with neurosurgery. She states patient needs to be admitted to the ICU at [facility] because there are no    She initially states he should not get anticoagulation. Asked transfer center to call JEREMÍAS Cho Spoke with Dr. Connie Arriaza, Pulm Critical Care at [facility].  He is very concerned about the PE and feels like it is of greater risk than the frontal lobe contusion. Told him about how neurosurgery PA told me that patient shouldn't be anticoagulated. He feels that patient is not stable to transfer without PEs being treated.    He suggests I speak with Neurosurgery attending to further ask about anticoagulation and if neurosurgery adamant about not given heparin, then he would like the patient to have an IVC filter prior to transfer. I also had Dr. Prateek England speak with Dr. Aroldo Whaley about the patient. Karen Newsome Spoke with JEREMÍAS Chaparro. She spoke with her attending, Dr. Zacarias Matt. Dr. Zacarias Matt states patient can go ahead and start anticoagulation. He would like repeat head CT in 6 hours to watch for expansion of the contusion. Karen Newsome Spoke with Dr. Aroldo Whaley at [facility] again. He agrees with plan for heparin to be started. We discussed how [facility] does not have neurosurgery here and our sister hospital [facility] does not take traumatic bleeds -- this contusion is considered traumatic from his initial fall that originally brought him into the hospital today. He did not hit his head while in our parking lot. Before accepting patient, he wants to talk to his trauma surgeon. He will call me back. Karen Newsome Spoke with Dr. Aroldo Whaley. He states that the trauma surgeon does not feel patient needs further trauma work up. Dr. Aroldo Whaley will accept the patient to his service for direct admission to the ICU at [facility].  We will have patient go via ALS with heparin running. Dr. Aroldo Whaley aware of neurosurgery's desire for repeat head CT in 6 hours. He asks for images to be sent with patient. Karen Newsome Updated patient and parents about the plan. We discussed the heparin. I told them the risks and benefits of the heparin. I explained that the heparin could expand the contusion in the brain and also cause bleeding in general.  I also explained to them that the PEs were very concerning and the treatment for them, so they would not cause any greater harm, was anticoagulation. They voiced understanding about the heparin, agree with the administration. Heparin order set initiated, bolus and infusion started. Karen Newsome Impression:  Syncope, frontal lobe contusion, heavy burden of pulmonary emboli bilaterally without saddle embolus likely causing the syncope, likely post traumatic seizure. Patient was loaded with 1 g of Keppra here and was started on heparin. Patient is accepted by Dr. Yany Khalil at [place] [id number] for admission to the ICU. Neurosurgery to consult for small frontal lobe contusion with close repeat head CT monitoring with heparin on board. Patient will be transferred via ALS. Patient and parents agree with the plan. Dr. Rory Kearney PA 
 
MED RECONCILIATION: 
Current Facility-Administered Medications Medication Dose Route Frequency  levETIRAcetam (KEPPRA) 500 mg in saline (iso-osm) 100 ml IVPB  1,000 mg IntraVENous Q12H  
 heparin 25,000 units in D5W 250 ml infusion  18-36 Units/kg/hr IntraVENous TITRATE Current Outpatient Prescriptions Medication Sig  
 thioridazine (MELLARIL) 50 mg tablet 3 tablets in the morning and 2 tablets in the evening  amitriptyline (ELAVIL) 100 mg tablet Take 1 Tab by mouth nightly. Disposition: 
Transfer Core Measures: 
 
Critical Care Time:  
Critical Care Time:  
I have spent 125 minutes of critical care time involved in lab review, consultations with specialist, family decision-making, and documentation. During this entire length of time I was immediately available to the patient. 
  
Critical Care: The reason for providing this level of medical care for this critically ill patient was due a critical illness that impaired one or more vital organ systems such that there was a high probability of imminent or life threatening deterioration in the patients condition. This care involved high complexity decision making to assess, manipulate, and support vital system functions, to treat this degreee vital organ system failure and to prevent further life threatening deterioration of the patients condition. For Hospitalized Patients: 
 
1.  Hospitalization Decision Time: 
 The decision to hospitalize the patient was made by [facility] and JEREMÍAS Wick  on 9/1/2018. He will transferred to tertiary care center for higher level of care for his traumatic frontal lobe contusion and bilateral PE. Diagnosis Clinical Impression: 1. Syncope and collapse 2. Post traumatic seizure ([facility] [address] 3. Other acute pulmonary embolism without acute cor pulmonale ([address] 4. Elevated troponin 5. Contusion of left frontal lobe with loss of consciousness, initial encounter ([facility] [address]

## 2018-09-02 NOTE — ED NOTES
Pt taken to [facility]. Pt alert to person on transfer

## 2018-09-03 LAB
ATRIAL RATE: 108 BPM
ATRIAL RATE: 115 BPM
CALCULATED P AXIS, ECG09: 25 DEGREES
CALCULATED P AXIS, ECG09: 32 DEGREES
CALCULATED R AXIS, ECG10: 64 DEGREES
CALCULATED R AXIS, ECG10: 77 DEGREES
CALCULATED T AXIS, ECG11: 27 DEGREES
CALCULATED T AXIS, ECG11: 48 DEGREES
DIAGNOSIS, 93000: NORMAL
DIAGNOSIS, 93000: NORMAL
P-R INTERVAL, ECG05: 166 MS
P-R INTERVAL, ECG05: 172 MS
Q-T INTERVAL, ECG07: 358 MS
Q-T INTERVAL, ECG07: 370 MS
QRS DURATION, ECG06: 114 MS
QRS DURATION, ECG06: 116 MS
QTC CALCULATION (BEZET), ECG08: 495 MS
QTC CALCULATION (BEZET), ECG08: 495 MS
VENTRICULAR RATE, ECG03: 108 BPM
VENTRICULAR RATE, ECG03: 115 BPM

## 2018-09-06 ENCOUNTER — PATIENT OUTREACH (OUTPATIENT)
Dept: INTERNAL MEDICINE CLINIC | Age: 58
End: 2018-09-06

## 2018-09-06 ENCOUNTER — TELEPHONE (OUTPATIENT)
Dept: INTERNAL MEDICINE CLINIC | Age: 58
End: 2018-09-06

## 2018-09-06 RX ORDER — LEVETIRACETAM 1000 MG/1
1000 TABLET ORAL
COMMUNITY
Start: 2018-09-05 | End: 2018-10-05 | Stop reason: SDUPTHER

## 2018-09-06 NOTE — TELEPHONE ENCOUNTER
Referred to  after fall- he got stitches in his head- they want to see for home health for  4 weeks for balance training. Will you sign orders for this? She is aware you are out of the office.  Call Seble Shipman [address] at # [phone]

## 2018-09-06 NOTE — PROGRESS NOTES
Hospital Discharge Follow-Up Date/Time:  2018 10:01 AM 
 
Patient was admitted to [facility] on 18 and discharged on 18 for bilateral pulmonary emboli. The physician discharge summary was available at the time of outreach. Family was contacted within 1 business days of discharge. Top Challenges reviewed with the provider  
-CT of head showed 7mm lesion; left frontal lobe 
-bilateral PE Method of communication with provider :chart routing Inpatient RRAT score: not calculated Was this a readmission? no  
 
Nurse Navigator (NN) contacted the parent by telephone to perform post hospital discharge assessment. Verified name and  with parent as identifiers. Provided introduction to self, and explanation of the Nurse Navigator role. Sabas De Santiago, mother Parent reported:  
Doing good Little weakness (with PT at time of call) Laceration to left forehead D/C/I and open to air Parent denied:  
Headache SOB 
CP Confusion N/V Blurred/double vision Unsteady gait Slurred speech Dizziness Redness Drainage Warmth Swelling Reviewed discharge instructions and red flags with parent who verbalized understanding. Parent given an opportunity to ask questions and does not have any further questions or concerns at this time. The parent agrees to contact the PCP office for questions related to their healthcare. NN provided contact information for future reference. Disease Specific:   N/A Summary of patient's top problems: 
1. Bilateral PE 
2. Brain hemorrhage 3. MR Home Health orders at discharge: PT Home Health company: [address] Date of initial visit: 18 Durable Medical Equipment ordered/company: None Barriers to care? No apparent or voiced barriers to care noted at this time. Advance Care Planning:  
Does patient have an Advance Directive:  not on file Medication(s):  
 New Medications at Discharge: Eliquis, Xarelto Changed Medications at Discharge: None Discontinued Medications at Discharge: Mellaril, Elavil Medication reconciliation was performed with parent, who verbalizes understanding of administration of home medications. There were no barriers to obtaining medications identified at this time. Referral to Pharm D needed: no  
 
Current Outpatient Prescriptions Medication Sig  [START ON 9/11/2018] apixaban (ELIQUIS) 5 mg tablet 5 mg.  apixaban (ELIQUIS) 5 mg (74 tabs) starter pack Take 10 mg (2 tablets) by mouth twice daily for 7 days, followed by 5 mg (1 tablet) twice daily. As directed  levETIRAcetam 1,000 mg tablet 1,000 mg. No current facility-administered medications for this visit. Medications Discontinued During This Encounter Medication Reason  thioridazine (MELLARIL) 50 mg tablet Not A Current Medication  amitriptyline (ELAVIL) 100 mg tablet Not A Current Medication [facility] follow up appointment(s):  
Future Appointments Date Time Provider Cresencio Jacinto 9/13/2018 11:00 AM Jason Corea MD [facility] follow up appointment(s):  
Dr. Ale Scruggs (psych) 9/18/18 Parent deferred scheduling vascular appt with Dr. Feliz Mayen until seen by PCP. Has contact info. Goals  Attends follow-up appointments as directed. 9/6/18: Scheduled PCP follow up appt.  Knowledge and adherence of prescribed medication (ie. action, side effects, missed dose, etc.).   
     
  9/6/18: Parent aware patient is not resume Elavil and Mellaril unless instructed by a physician. Patient has started new medications.  Understands red flags post discharge. Plan: Assess and educate for red flags to monitor and report. 9/6/18: Parent denied red flags and was able to provide teach back on s/s to monitor and report.

## 2018-09-10 NOTE — TELEPHONE ENCOUNTER
Anne Marie Gonzalez MD   [facility] Nurses 19 hours ago (3:11 PM)                 It does not make sense to me that balance training can be done by home health, this is generally done at 1 of the physical therapy offices where they have proper equipment and training          I called [facility], its home physical therapy that will do this.  I told her it should be fine if that's ok with you, otherwise let me know

## 2018-09-13 ENCOUNTER — PATIENT OUTREACH (OUTPATIENT)
Dept: INTERNAL MEDICINE CLINIC | Age: 58
End: 2018-09-13

## 2018-09-13 ENCOUNTER — OFFICE VISIT (OUTPATIENT)
Dept: INTERNAL MEDICINE CLINIC | Age: 58
End: 2018-09-13

## 2018-09-13 VITALS
BODY MASS INDEX: 33.9 KG/M2 | HEIGHT: 70 IN | TEMPERATURE: 98.5 F | DIASTOLIC BLOOD PRESSURE: 74 MMHG | HEART RATE: 107 BPM | WEIGHT: 236.8 LBS | RESPIRATION RATE: 12 BRPM | SYSTOLIC BLOOD PRESSURE: 120 MMHG | OXYGEN SATURATION: 97 %

## 2018-09-13 DIAGNOSIS — I26.99 OTHER ACUTE PULMONARY EMBOLISM WITHOUT ACUTE COR PULMONALE (HCC): Primary | ICD-10-CM

## 2018-09-13 DIAGNOSIS — E66.9 OBESITY (BMI 30-39.9): ICD-10-CM

## 2018-09-13 DIAGNOSIS — S01.01XA LACERATION OF SCALP, INITIAL ENCOUNTER: ICD-10-CM

## 2018-09-13 NOTE — PROGRESS NOTES
Mary Meyer 1960, is a 62 y.o. male, who is seen today for evaluation of recent scalp laceration and pulmonary emboli. On September 1 the patient was walking outside and fell lacerating the left frontal area of his scalp. He was taken to the emergency room and found to have multiple pulmonary emboli on the right and left. He was transferred to [place] and was finally discharged 8 days ago. He was confused at that time and CT did show a possible small area in the left frontal region of hemorrhage. His mental status is totally cleared since then and he says he is breathing well but occasionally has some mild discomfort anteriorly in the upper left chest.  It is sharp and very brief, sometimes occurs when he takes a deep breath. He has had no chills or fever. He apparently had evidence of DVT as well but unclear which side as there were no symptoms and continue to be no symptoms of pain or swelling in the legs. He is fairly sedentary but does not have other risk factors for pulmonary embolism and has had no DVT or PE in the past.  The doctors at the hospital told him that he should be off his antipsychotic medication because they felt that it could actually cause DVT. He has been on these drugs for many years to control marked agitation associated with mental retardation. Past Medical History:  
Diagnosis Date  Mental retardation  Overweight(278.02) History reviewed. No pertinent surgical history. Current Outpatient Prescriptions Medication Sig Dispense Refill  apixaban (ELIQUIS) 5 mg tablet 5 mg.  apixaban (ELIQUIS) 5 mg (74 tabs) starter pack Take 10 mg (2 tablets) by mouth twice daily for 7 days, followed by 5 mg (1 tablet) twice daily. As directed  levETIRAcetam 1,000 mg tablet 1,000 mg. No Known Allergies Visit Vitals  /74  Pulse (!) 107  Temp 98.5 °F (36.9 °C) (Oral)  Resp 12  Ht 5' 10\" (1.778 m)  Wt 236 lb 12.8 oz (107.4 kg)  SpO2 97%  BMI 33.98 kg/m2 There are staples in frontal area on the left and the underlying lacerations are clearly healing very nicely with no surrounding redness or tenderness, no induration. Lungs are clear to percussion. Good breath sounds with no wheezing or crackles and no rub. Heart reveals a regular rhythm with no murmur gallop click or rub. Lower extremities reveal no clubbing cyanosis or edema. There is no tenderness in the calves. No tenderness in the thighs. Assessment: #1. DVT  of 1 of his legs associated with a heavy burden of bilateral pulmonary emboli, he is breathing well now and oxygen saturations 97%. Lungs sound clear. He will continue Eliquis 5 mg twice a day for a total of 6 months of therapy, he was on Eliquis twice a day for the first week at least.  That dosage changed yesterday. I confirmed that with his mother. #2.  Mental retardation with agitation has been doing well for many years on Mellaril and Elavil. His mother will restart those drugs, the benefits outweigh the risks. #3.  Obesity unchanged, encouraged him to work on weight loss and discussed some of the details on dietary changes with the patient and his mother. #4. The laceration sustained to the left frontal area looks good, staples will be removed by my nurse today. No need for any additional treatment to this area, there is no open area. Follow-up in about 5 months, if he is doing well at that time we will be able to stop Eliquis soon thereafter. Laron Torres, [address] Please note: This document has been produced using voice recognition software. Unrecognized errors in transcription may be present.

## 2018-09-13 NOTE — MR AVS SNAPSHOT
[address] 
[phone] Patient: Rivas Colon MRN: [id number] :1960 Visit Information Date & Time Provider Department Dept. Phone Encounter #  
 2018 11:00 AM Kamila Mixon MD [facility] [phone] Upcoming Health Maintenance Date Due Hepatitis C Screening 1960 DTaP/Tdap/Td series (1 - Tdap) 1981 FOBT Q 1 YEAR AGE 50-75 2010 Influenza Age 5 to Adult 2018 MEDICARE YEARLY EXAM 2018 Allergies as of 2018  Review Complete On: 2018 By: Kamila Mixon MD  
 No Known Allergies Current Immunizations  Reviewed on 2018 No immunizations on file. Reviewed by Kamila Mixon MD on 2018 at 11:29 AM  
You Were Diagnosed With   
  
 Codes Comments Other acute pulmonary embolism without acute cor pulmonale (HCC)    -  Primary ICD-10-CM: I26.99 
ICD-9-CM: 415.19 Obesity (BMI 30-39. 9)     ICD-10-CM: E66.9 ICD-9-CM: 278.00 Laceration of scalp, initial encounter     ICD-10-CM: S01. Daniel Crass ICD-9-CM: 873.0 Vitals BP Pulse Temp Resp Height(growth percentile) Weight(growth percentile) 120/74 (!) 107 98.5 °F (36.9 °C) (Oral) 12 5' 10\" (1.778 m) 236 lb 12.8 oz (107.4 kg) SpO2 BMI Smoking Status 97% 33.98 kg/m2 Never Smoker Vitals History BMI and BSA Data Body Mass Index Body Surface Area 33.98 kg/m 2 2.3 m 2 Your Updated Medication List  
  
   
This list is accurate as of 18 11:49 AM.  Always use your most recent med list.  
  
  
  
  
 * apixaban 5 mg (74 tabs) starter pack Commonly known as:  Fabianaland Hitesh Take 10 mg (2 tablets) by mouth twice daily for 7 days, followed by 5 mg (1 tablet) twice daily. As directed * apixaban 5 mg tablet Commonly known as:  ELIQUIS  
5 mg.  
  
 levETIRAcetam 1,000 mg tablet 1,000 mg.  
  
 * Notice: This list has 2 medication(s) that are the same as other medications prescribed for you. Read the directions carefully, and ask your doctor or other care provider to review them with you. Introducing [facility]! Dear Hilary Rashid: 
Thank you for requesting a [employer] account. Our records indicate that you already have an active [employer] account. You can access your account anytime at [email or web address] Did you know that you can access your hospital and ER discharge instructions at any time in [employer]? You can also review all of your test results from your hospital stay or ER visit. Additional Information If you have questions, please visit the Frequently Asked Questions section of the [employer] website at [email or web address] Remember, [employer] is NOT to be used for urgent needs. For medical emergencies, dial 911. Now available from your iPhone and Android! Please provide this summary of care documentation to your next provider. Your primary care clinician is listed as Mickey Garcia. Jolie Linares. If you have any questions after today's visit, please call [phone].

## 2018-09-13 NOTE — PROGRESS NOTES
Goals Addressed Most Recent  Attends follow-up appointments as directed. On track (9/13/2018)  
       
  9/6/18: Scheduled PCP follow up appt. 9/13/18: Attended PCP appt as scheduled.

## 2018-09-20 ENCOUNTER — PATIENT OUTREACH (OUTPATIENT)
Dept: INTERNAL MEDICINE CLINIC | Age: 58
End: 2018-09-20

## 2018-09-20 NOTE — PROGRESS NOTES
Transitions of Care Coordination  Follow-Up Date/Time:  9/20/2018 2:33 PM 
  
NN contacted family for Transitions of Care Coordination  follow up. Spoke to family. Introduced self/role and reason for call. Family reported:  
Patient back to his old self Forehead wound looks good Resumed Elavil and Mellaril Pertinent negatives: SOB 
CP Leg swelling Confusion Headache Dizziness N/V 
S/s of infection Medications:  
New medications since last outreach: no 
Does patient need refills on any medications: no 
Medication changes since last outreach (dose adjustments or discontinued meds): no 
 
[email or web address]: [employer] Date of discharge: TBD Barriers to care? No apparent or voiced barriers to care noted at this time. Specialist appointments since last outreach? no 
  
Family reminded that there are physicians on call 24 hours a day / 7 days a week (M-F 5pm to 8am and from Friday 5pm until Monday 8a for the weekend) should the patient have questions or concerns. Future Appointments Date Time Provider Cresencio Jacinto 2/14/2019 10:00 AM Kami Diaz MD [facility]  Attends follow-up appointments as directed. 9/6/18: Scheduled PCP follow up appt. 9/13/18: Attended PCP appt as scheduled.  Knowledge and adherence of prescribed medication (ie. action, side effects, missed dose, etc.).   
     
  9/6/18: Parent aware patient is not resume Elavil and Mellaril unless instructed by a physician. Patient has started new medications. 9/20/18: Patient now taking Eliquis BID and has resumed Elavil and Mellaril per PCP recommended.  Understands red flags post discharge. Plan: Assess and educate for red flags to monitor and report. 9/6/18: Parent denied red flags and was able to provide teach back on s/s to monitor and report. 9/20/18: Parent denied red flags at this time.

## 2018-09-27 ENCOUNTER — PATIENT OUTREACH (OUTPATIENT)
Dept: INTERNAL MEDICINE CLINIC | Age: 58
End: 2018-09-27

## 2018-09-27 RX ORDER — DOXYCYCLINE HYCLATE 20 MG
20 TABLET ORAL 2 TIMES DAILY
COMMUNITY
End: 2020-02-03 | Stop reason: ALTCHOICE

## 2018-09-27 RX ORDER — AMITRIPTYLINE HYDROCHLORIDE 100 MG/1
TABLET, FILM COATED ORAL
COMMUNITY
End: 2019-01-16 | Stop reason: SDUPTHER

## 2018-09-27 NOTE — PROGRESS NOTES
Betito Mei, patient mother called to report patient is not taking Mellaril but has resumed Elavil. Medications reconciled. Outpatient Prescriptions Marked as Taking for the 9/27/18 encounter (Patient Outreach) with Junior Arango RN Medication Sig Dispense Refill  doxycycline (PERIOSTAT) 20 mg tablet Take 20 mg by mouth two (2) times a day.  amitriptyline (ELAVIL) 100 mg tablet Take  by mouth nightly.  apixaban (ELIQUIS) 5 mg (74 tabs) starter pack Take 10 mg (2 tablets) by mouth twice daily for 7 days, followed by 5 mg (1 tablet) twice daily. As directed  levETIRAcetam 1,000 mg tablet 1,000 mg. Mrs. Sterling Myles reported patient is still doing good off the Mellaril except for being a little bit noisy. Mrs. Sterling Myles verbalized she will speak with psychiatrist before restarting Mellaril. Advised Mrs. Steele to contact office if patient resumes so med list can be updated.

## 2018-10-05 RX ORDER — LEVETIRACETAM 1000 MG/1
1000 TABLET ORAL 2 TIMES DAILY
Qty: 180 TAB | Refills: 1 | Status: SHIPPED | OUTPATIENT
Start: 2018-10-05 | End: 2019-03-26 | Stop reason: SDUPTHER

## 2018-10-05 NOTE — TELEPHONE ENCOUNTER
Last Visit: 09/13/2018 with MD Gorge Glasgow    Next Appointment: 02/14/2019 with MD Gorge Glasgow     Requested Prescriptions     Pending Prescriptions Disp Refills    levETIRAcetam 1,000 mg tablet 180 Tab 1     Sig: Take 1 Tab by mouth two (2) times a day.  apixaban (ELIQUIS) 5 mg tablet 180 Tab 1     Sig: Take 1 Tab by mouth two (2) times a day.

## 2018-10-08 ENCOUNTER — PATIENT OUTREACH (OUTPATIENT)
Dept: INTERNAL MEDICINE CLINIC | Age: 58
End: 2018-10-08

## 2018-10-08 NOTE — PROGRESS NOTES
Patient has graduated from the Transitions of Care Coordination  program on 10/8/18. Patient's symptoms are stable at this time. Patient/family has the ability to self-manage. Care management goals have been completed at this time. No further nurse navigator follow up scheduled. Goals Addressed             Most Recent     COMPLETED: Attends follow-up appointments as directed. On track (9/13/2018)             9/6/18: Scheduled PCP follow up appt. 9/13/18: Attended PCP appt as scheduled.  COMPLETED: Knowledge and adherence of prescribed medication (ie. action, side effects, missed dose, etc.). On track (9/20/2018)             9/6/18: Parent aware patient is not resume Elavil and Mellaril unless instructed by a physician. Patient has started new medications. 9/20/18: Patient now taking Eliquis BID and has resumed Elavil and Mellaril per PCP recommended.  COMPLETED: Understands red flags post discharge. On track (9/20/2018)             Plan: Assess and educate for red flags to monitor and report. 9/6/18: Parent denied red flags and was able to provide teach back on s/s to monitor and report. 9/20/18: Parent denied red flags at this time. Pt has nurse navigator's contact information for any further questions, concerns, or needs.   Patients upcoming visits:  Future Appointments  Date Time Provider Cresencio Jacinto   2/14/2019 10:00 AM Jones Jacobson MD [facility]

## 2018-10-16 ENCOUNTER — TELEPHONE (OUTPATIENT)
Dept: INTERNAL MEDICINE CLINIC | Age: 58
End: 2018-10-16

## 2018-10-16 DIAGNOSIS — R56.9 SEIZURES (HCC): Primary | ICD-10-CM

## 2018-10-16 NOTE — TELEPHONE ENCOUNTER
Patient's Mother is calling to speak to a nurse. Stating he was in the hospital and was given anti-seizure meds. He just had a seizure. He has never had 1 before and she wants to know if Dr. Too Alonso is aware of any of this.

## 2018-10-17 ENCOUNTER — TELEPHONE (OUTPATIENT)
Dept: INTERNAL MEDICINE CLINIC | Age: 58
End: 2018-10-17

## 2018-10-17 NOTE — TELEPHONE ENCOUNTER
Called patient to let Mrs. Prosper Mckeon know that Dr. Margaret Hernandez has put in a referral for patient to see a neurology ist. Patient will wait to be scheduled.

## 2018-11-19 ENCOUNTER — DOCUMENTATION ONLY (OUTPATIENT)
Dept: NEUROLOGY | Age: 58
End: 2018-11-19

## 2018-11-26 ENCOUNTER — OFFICE VISIT (OUTPATIENT)
Dept: NEUROLOGY | Age: 58
End: 2018-11-26

## 2018-11-26 VITALS
OXYGEN SATURATION: 95 % | BODY MASS INDEX: 34.07 KG/M2 | HEIGHT: 70 IN | TEMPERATURE: 97.4 F | DIASTOLIC BLOOD PRESSURE: 82 MMHG | RESPIRATION RATE: 16 BRPM | SYSTOLIC BLOOD PRESSURE: 122 MMHG | WEIGHT: 238 LBS | HEART RATE: 85 BPM

## 2018-11-26 DIAGNOSIS — F79 MENTAL RETARDATION: Primary | ICD-10-CM

## 2018-11-26 DIAGNOSIS — R56.9 SEIZURES (HCC): ICD-10-CM

## 2018-11-26 RX ORDER — CHLORHEXIDINE GLUCONATE 1.2 MG/ML
RINSE ORAL
Refills: 0 | COMMUNITY
Start: 2018-11-02 | End: 2020-12-17 | Stop reason: ALTCHOICE

## 2018-11-26 RX ORDER — THIORIDAZINE HYDROCHLORIDE 50 MG/1
TABLET, FILM COATED ORAL
Refills: 0 | COMMUNITY
Start: 2018-10-25 | End: 2019-01-30 | Stop reason: SDUPTHER

## 2018-11-26 NOTE — PROGRESS NOTES
Chief Complaint   Patient presents with   [facility].    Seizure

## 2018-11-26 NOTE — PROGRESS NOTES
Mirian Santillan is a 62 y.o., right handed male, with an established history of mental retardation, pulmonary embolism who has had a single seizure. This event was back in 2018. He had a spontaneous pulmonary embolism 9/3/2018. This was associated with a syncopal event. During the syncopal event he was noted to have a brief tonic clonic seizure. According to his mother he's never had a seizure before this episode. He was discharged from the hospital,  but his mother wasn't sure why he was on the medication, so this was stopped and he had a seizure a week after the discontinuation of the drug. She's since placed him back on the [address] and he's not had any convulsive activity since. There's no side effects with the medication. He's been placed back on Mellaril and Elavil prescribed by his psychiatry nurse practitioner. Never had seizure even as an infant. No family history of seizures. He's back to his pre-head trauma baseline according to his mother. Social History; single, lives at home with mother. No tobacco, alcohol or illicit drugs. Disabled. Family History; mother alive with hypertension. Father  from cardiac disease, alcohol abuse, hypertension. Siblings with hypertension. Current Outpatient Medications   Medication Sig Dispense Refill    thioridazine (MELLARIL) 50 mg tablet TAKE 5 TABLET BY MOUTH AS DIRECTED: TAKE 2 TABLETS BY MOUTH AT 8AM AND TAKE 3 TABLETS BY MOUTH AT BEDTIME  0    chlorhexidine (PERIDEX) 0.12 % solution   0    levETIRAcetam 1,000 mg tablet Take 1 Tab by mouth two (2) times a day. 180 Tab 1    apixaban (ELIQUIS) 5 mg tablet Take 1 Tab by mouth two (2) times a day. 180 Tab 1    doxycycline (PERIOSTAT) 20 mg tablet Take 20 mg by mouth two (2) times a day.  amitriptyline (ELAVIL) 100 mg tablet Take  by mouth nightly.          Past Medical History:   Diagnosis Date    Mental retardation     Overweight(278.02)        No past surgical history on file.    No Known Allergies    Patient Active Problem List   Diagnosis Code    Eczema L30.9    Mental retardation F79    Routine general medical examination at a health care facility Z00.00    Obesity (BMI 30-39. 9) E66.9    Pulmonary embolus (HCC) I26.99         Review of Systems: he has no complaints. As above otherwise 11 point review of systems negative including;   Constitutional no fever or chills  Skin denies rash or itching  HENT  Denies tinnitus, hearing lose  Eyes denies diplopia vision lose  Respiratory denies shortness of breath  Cardiovascular denies chest pain, dyspnea on exertion  Gastrointestinal denies nausea, vomiting, diarrhea, constipation  Genitourinary denies incontinence  Musculoskeletal denies joint pain or swelling  Endocrine denies weight change  Hematology denies easy bruising or bleeding   Neurological as above in HPI      PHYSICAL EXAMINATION:      VITAL SIGNS:    Visit Vitals  /82 (BP 1 Location: Right arm, BP Patient Position: Sitting)   Pulse 85   Temp 97.4 °F (36.3 °C) (Oral)   Resp 16   Ht 5' 10\" (1.778 m)   Wt 108 kg (238 lb)   SpO2 95%   BMI 34.15 kg/m²       GENERAL: The patient is well developed, well nourished, and in no apparent distress. EXTREMITIES: No clubbing, cyanosis, or edema is identified. Pulses 2+ and symmetrical.  Muscle tone is normal.  HEAD:   Ear, nose, and throat appear to be without trauma. The patient is normocephalic. NEUROLOGIC EXAMINATION    MENTAL STATUS: The patient is awake, alert, and oriented x 4. Fund of knowledge is adequate. Speech is fluent and memory appears to be intact, both long and short term. He is loud and has some bizarre ideation. CRANIAL NERVES: II - Visual fields are full to confrontation. Funduscopic examination reveals flat disks bilaterally. Pupils are both 3 mm and briskly reactive to light and accommodation. III, IV, VI - Extraocular movements are intact and there is no nystagmus.    V - Facial sensation is intact to pinprick and light touch. VII - Face is symmetrical.   VIII - Hearing is present. IX, X, XII- Palate rises symmetrically. Gag is present. Tongue is in the midline. XI - Shoulder shrugging and head turning intact  MOTOR:  The patient is 5/5 in all four limbs without any drift. Fine finger movements are symmetrical.  Isolated motor group testing reveals no focal abnormalities. Tone is normal.  Sensory examination is intact to pinprick, light touch and position sense testing. Reflexes are 2+ and symmetrical. Plantars are down going. Cerebellar examination reveals no gross ataxia or dysmetria. Gait is normal and the patient can tandem walk without any difficulty. CT HEAD W/O CONTRAST9/26/2018  [facility]  Result Impression     1. Hyperdensity in the left frontal lobe has resolved. 2. No evidence of any new or worsening intracranial hemorrhage. Dictated ByJenn Garzon on 9/26/2018 1:13 PM probably is a    As attending radiologist, I have assessed the study images, and dictated or reviewed/edited the final report as needed. Signed By: Renee Flores MD on 9/26/2018 1:44 PM   Result Narrative   EXAM: CT HEAD WITHOUT CONTRAST    CLINICAL INDICATION/HISTORY: I61.1: Punctate hemorrhage of left frontal lobe ([facility] [address] Evaluation of previously identified punctate hemorrhage. COMPARISON: 9/3/2018    TECHNIQUE: Axial imaging of the head from the skull base to the vertex without IV contrast and reconstructed into coronal and sagittal planes. All CT scans at this facility are performed using dose optimization technique as appropriate to a performed exam, to include automated exposure control, adjustment of the MA and/or kV according to patient size (including appropriate matching for site-specific examinations) or use of  iterative reconstruction technique. Brain: Small hyperdensity in the high left frontal lobe has resolved. No evidence of any chronic or acute infarction.  No mass lesion or midline effect identified. Extra-axial spaces: Within normal limits    Ventricular system: Within normal limits. Intracranial hemorrhage: No evidence of any new or worsening intra-axial or extra-axial hemorrhage. Midline shift: No midline shift appreciated. Sinuses/mastoid air cells: Clear. Calvarium: Unremarkable. I have reviewed the above imagines myself. CBC:   Lab Results   Component Value Date/Time    WBC 16.5 (H) 09/01/2018 01:09 PM    RBC 4.48 (L) 09/01/2018 01:09 PM    HGB 13.4 09/01/2018 01:09 PM    HCT 38.8 09/01/2018 01:09 PM    PLATELET 368 36/66/6840 01:09 PM     BMP:   Lab Results   Component Value Date/Time    Glucose 136 (H) 09/01/2018 01:09 PM    Sodium 139 09/01/2018 01:09 PM    Potassium 4.6 09/01/2018 01:09 PM    Chloride 104 09/01/2018 01:09 PM    CO2 24 09/01/2018 01:09 PM    BUN 15 09/01/2018 01:09 PM    Creatinine 1.16 09/01/2018 01:09 PM    Calcium 9.0 09/01/2018 01:09 PM     CMP:   Lab Results   Component Value Date/Time    Glucose 136 (H) 09/01/2018 01:09 PM    Sodium 139 09/01/2018 01:09 PM    Potassium 4.6 09/01/2018 01:09 PM    Chloride 104 09/01/2018 01:09 PM    CO2 24 09/01/2018 01:09 PM    BUN 15 09/01/2018 01:09 PM    Creatinine 1.16 09/01/2018 01:09 PM    Calcium 9.0 09/01/2018 01:09 PM    Anion gap 11 09/01/2018 01:09 PM    BUN/Creatinine ratio 13 09/01/2018 01:09 PM    Alk.  phosphatase 120 (H) 09/01/2018 01:09 PM    Protein, total 7.3 09/01/2018 01:09 PM    Albumin 3.5 09/01/2018 01:09 PM    Globulin 3.8 09/01/2018 01:09 PM    A-G Ratio 0.9 09/01/2018 01:09 PM     Coagulation:   Lab Results   Component Value Date/Time    Prothrombin time 14.0 09/01/2018 07:07 PM    INR 1.1 09/01/2018 07:07 PM    aPTT 23.4 09/01/2018 07:07 PM     Cardiac markers:   Lab Results   Component Value Date/Time    CK 70 09/01/2018 01:09 PM    CK-MB Index 2.1 09/01/2018 01:09 PM          Impression: New onset seizures in this man who has risk factors including recent trauma and history of cognitive disability. It seems like he may have had one seizure at the time of his syncopal event which would be a syncopal seizure and not necessarily epileptic in its nature. The second event occurred when his mother withdrew him from his medications which can happen. Does this make this man and epileptic? I do not think so at this time. Plan: I would first like to get an EEG. If it is benign then I will slowly taper him from his Keppra. Hopefully will not have any seizures. Driving is not a risk for this gentleman since he does not participate in this activity and therefore would not really resting much except for a single episode of seizures. I will follow him closely with you. PLEASE NOTE:   This document has been produced using voice recognition software. Unrecognized errors in transcription may be present.

## 2018-11-26 NOTE — LETTER
11/26/2018 3:53 PM 
 
Patient:  Marisela Perez YOB: 1960 Date of Visit: 11/26/2018 Dear Shannen Underwood MD 
[address] [id number] [place] [address] VIA In Basket 
 : Thank you for referring Mr. Marisela Perez to me for evaluation/treatment. Below are the relevant portions of my assessment and plan of care. If you have questions, please do not hesitate to call me. I look forward to following Mr. Julius Cleveland along with you.  
 
 
 
Sincerely, 
 
 
Eleni Mars MD

## 2018-12-10 ENCOUNTER — HOSPITAL ENCOUNTER (OUTPATIENT)
Dept: NEUROLOGY | Age: 58
Discharge: HOME | End: 2018-12-10
Attending: PSYCHIATRY & NEUROLOGY
Payer: MEDICARE

## 2018-12-10 DIAGNOSIS — R56.9 SEIZURES (HCC): ICD-10-CM

## 2018-12-10 PROCEDURE — 95819 EEG AWAKE AND ASLEEP: CPT

## 2018-12-10 NOTE — PROCEDURES
[facility]  EEG    Farzad Charles  MR#: [id number]  : 1960  ACCOUNT #: [de-identified]   DATE OF SERVICE: 12/10/2018    ELECTROENCEPHALOGRAM NUMBER:  Pending. HISTORY:  A 49-year-old male with history of mental retardation and new-onset seizures. MEDICATIONS:  Include thioridazine, Keppra, Eliquis, amitriptyline. ELECTROENCEPHALOGRAM REPORT:  This is a 16-channel routine EEG done using the International 10-20 electrode placement system. The predominant background consists of 7-8 Hz posterior predominant symmetric rhythms which attenuate with eye opening. Throughout the recording, relatively frequent left frontotemporal sharps were obtained which stand out from the background, but without sustained epileptiform abnormalities. These sharp transients appeared to be predominantly seen at the T3 lead. There were no abnormal photoparoxysmal responses. There was no rhythmic activity and no sustained epileptiform abnormalities were observed. Some periods of drowsiness consisted of more irregular 6 Hz central predominant beta activity were observed. IMPRESSION:  This EEG shows relatively frequent left temporal sharp transients raising concern for left temporal epileptiform focus. Clinical correlation is advised.       MD SUSAN Whitfield / [place]  D: 12/10/2018 14:17     T: 12/10/2018 17:00  JOB #: [id number]  CC: Alicia Atkinson MD

## 2019-01-04 ENCOUNTER — TELEPHONE (OUTPATIENT)
Dept: NEUROLOGY | Age: 59
End: 2019-01-04

## 2019-01-04 NOTE — TELEPHONE ENCOUNTER
Pt has a f/u appt on 1/16/19. Pt's mother is unable to take him to appt that day and she requests notes from  regarding test results and any change in medication given at 1/16 appt. Pt's mother keeps all of his medical info, but father will take him to appt that day.

## 2019-01-07 ENCOUNTER — TELEPHONE (OUTPATIENT)
Dept: INTERNAL MEDICINE CLINIC | Age: 59
End: 2019-01-07

## 2019-01-07 DIAGNOSIS — F79 MENTAL RETARDATION: Primary | ICD-10-CM

## 2019-01-07 NOTE — TELEPHONE ENCOUNTER
Patient's Mother, Nydia Ceja, is calling stating Mrs. Sky psychiatrist, [facility], permanently closed on 12/31/18. They weren't given any kind of notice. She is concerned because he will run out of his meds at the end of the month.  He needs a referral to a new psychiatrist.

## 2019-01-16 ENCOUNTER — OFFICE VISIT (OUTPATIENT)
Dept: NEUROLOGY | Age: 59
End: 2019-01-16

## 2019-01-16 VITALS
SYSTOLIC BLOOD PRESSURE: 112 MMHG | WEIGHT: 237.8 LBS | BODY MASS INDEX: 34.04 KG/M2 | HEART RATE: 85 BPM | RESPIRATION RATE: 22 BRPM | DIASTOLIC BLOOD PRESSURE: 80 MMHG | HEIGHT: 70 IN | OXYGEN SATURATION: 93 % | TEMPERATURE: 97.4 F

## 2019-01-16 DIAGNOSIS — R56.9 SEIZURES (HCC): Primary | ICD-10-CM

## 2019-01-16 DIAGNOSIS — F20.3 UNDIFFERENTIATED SCHIZOPHRENIA (HCC): Primary | ICD-10-CM

## 2019-01-16 DIAGNOSIS — F79 MENTAL RETARDATION: ICD-10-CM

## 2019-01-16 RX ORDER — THIORIDAZINE HYDROCHLORIDE 50 MG/1
TABLET, FILM COATED ORAL
Qty: 450 TAB | Refills: 1 | Status: SHIPPED | OUTPATIENT
Start: 2019-01-16 | End: 2019-01-30 | Stop reason: SDUPTHER

## 2019-01-16 RX ORDER — AMITRIPTYLINE HYDROCHLORIDE 100 MG/1
TABLET, FILM COATED ORAL
Status: CANCELLED | OUTPATIENT
Start: 2019-01-16

## 2019-01-16 RX ORDER — AMITRIPTYLINE HYDROCHLORIDE 100 MG/1
TABLET, FILM COATED ORAL
Qty: 90 TAB | Refills: 1 | Status: SHIPPED | OUTPATIENT
Start: 2019-01-16 | End: 2019-08-26 | Stop reason: SDUPTHER

## 2019-01-16 RX ORDER — THIORIDAZINE HYDROCHLORIDE 50 MG/1
TABLET, FILM COATED ORAL
Refills: 0 | OUTPATIENT
Start: 2019-01-16

## 2019-01-16 NOTE — PROGRESS NOTES
Lawyer Adair is a 62 y.o. male in today for follow-up on EEG results. 1. Have you been to the ER, urgent care clinic since your last visit? Hospitalized since your last visit? No    2. Have you seen or consulted any other health care providers outside of the [address] [place] [address] since your last visit? Include any pap smears or colon screening.  No

## 2019-01-16 NOTE — PROGRESS NOTES
Re:  Kerrie Bear up visit     1/16/2019 10:26 AM    SSN: [id number]    Subjective: Diane Pettit is seen in follow up, mother's present. No further spells. He's still taking Keppra as prescribed. Medications:    Current Outpatient Medications   Medication Sig Dispense Refill    thioridazine (MELLARIL) 50 mg tablet TAKE 5 TABLET BY MOUTH AS DIRECTED: TAKE 2 TABLETS BY MOUTH AT 8AM AND TAKE 3 TABLETS BY MOUTH AT BEDTIME  0    chlorhexidine (PERIDEX) 0.12 % solution   0    levETIRAcetam 1,000 mg tablet Take 1 Tab by mouth two (2) times a day. 180 Tab 1    apixaban (ELIQUIS) 5 mg tablet Take 1 Tab by mouth two (2) times a day. 180 Tab 1    doxycycline (PERIOSTAT) 20 mg tablet Take 20 mg by mouth two (2) times a day.  amitriptyline (ELAVIL) 100 mg tablet Take  by mouth nightly. Vital signs:    Visit Vitals  /80 (BP 1 Location: Left arm, BP Patient Position: Sitting)   Pulse 85   Temp 97.4 °F (36.3 °C) (Oral)   Resp 22   Ht 5' 10\" (1.778 m)   Wt 107.9 kg (237 lb 12.8 oz)   SpO2 93%   BMI 34.12 kg/m²       Review of Systems:   As above otherwise 11 point review of systems negative including;   Constitutional no fever or chills  Skin denies rash or itching  HEENT  Denies tinnitus, hearing lose  Eyes denies diplopia vision lose  Respiratory denies sortness of breath  Cardiovascular denies chest pain, dyspnea on exertion  Gastrointestinal denies nausea, vomiting, diarrhea, constipation  Genitourinary denies incontinence  Musculoskeletal denies joint pain or swelling  Endocrine denies weight change  Hematology denies easy bruising or bleeding   Neurological as above in HPI      Patient Active Problem List   Diagnosis Code    Eczema L30.9    Mental retardation F79    Routine general medical examination at a health care facility Z00.00    Obesity (BMI 30-39. 9) E66.9    Pulmonary embolus (HCC) I26.99         Objective:  The patient is awake, alert, and oriented x 4.  Fund of knowledge is poor. Speech is fluent and memory is intact. 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 Cranial Nerves: II - Visual fields are full to confrontation. III, IV, VI - Extraocular movements are intact. There is no nystagmus. V - Facial sensation is intact to pinprick. VII - Face is symmetrical.  VIII - Hearing is present. IX, X, XII - Palate is symmetrical.   XI - Shoulder shrugging and head turning intact  Motor: The patient moves all four limbs fairly well and symmetrically. Tone is normal. Reflexes are 2+ and symmetrical. Plantars are down going. Gait is normal.    [facility]  EEG     Chan Valdes  MR#: [id number]  : 1960  ACCOUNT #: [de-identified]   DATE OF SERVICE: 12/10/2018     ELECTROENCEPHALOGRAM NUMBER:  Pending.     HISTORY:  A 51-year-old male with history of mental retardation and new-onset seizures.     MEDICATIONS:  Include thioridazine, Keppra, Eliquis, amitriptyline.     ELECTROENCEPHALOGRAM REPORT:  This is a 16-channel routine EEG done using the International 10-20 electrode placement system. The predominant background consists of 7-8 Hz posterior predominant symmetric rhythms which attenuate with eye opening. Throughout the recording, relatively frequent left frontotemporal sharps were obtained which stand out from the background, but without sustained epileptiform abnormalities. These sharp transients appeared to be predominantly seen at the T3 lead. There were no abnormal photoparoxysmal responses. There was no rhythmic activity and no sustained epileptiform abnormalities were observed. Some periods of drowsiness consisted of more irregular 6 Hz central predominant beta activity were observed.     IMPRESSION:  This EEG shows relatively frequent left temporal sharp transients raising concern for left temporal epileptiform focus.   Clinical correlation is advised.        Lottie Solitario MD        I have reviewed the above imagines myself. CBC:   Lab Results   Component Value Date/Time    WBC 16.5 (H) 09/01/2018 01:09 PM    RBC 4.48 (L) 09/01/2018 01:09 PM    HGB 13.4 09/01/2018 01:09 PM    HCT 38.8 09/01/2018 01:09 PM    PLATELET 780 28/59/6588 01:09 PM     BMP:   Lab Results   Component Value Date/Time    Glucose 136 (H) 09/01/2018 01:09 PM    Sodium 139 09/01/2018 01:09 PM    Potassium 4.6 09/01/2018 01:09 PM    Chloride 104 09/01/2018 01:09 PM    CO2 24 09/01/2018 01:09 PM    BUN 15 09/01/2018 01:09 PM    Creatinine 1.16 09/01/2018 01:09 PM    Calcium 9.0 09/01/2018 01:09 PM     CMP:   Lab Results   Component Value Date/Time    Glucose 136 (H) 09/01/2018 01:09 PM    Sodium 139 09/01/2018 01:09 PM    Potassium 4.6 09/01/2018 01:09 PM    Chloride 104 09/01/2018 01:09 PM    CO2 24 09/01/2018 01:09 PM    BUN 15 09/01/2018 01:09 PM    Creatinine 1.16 09/01/2018 01:09 PM    Calcium 9.0 09/01/2018 01:09 PM    Anion gap 11 09/01/2018 01:09 PM    BUN/Creatinine ratio 13 09/01/2018 01:09 PM    Alk. phosphatase 120 (H) 09/01/2018 01:09 PM    Protein, total 7.3 09/01/2018 01:09 PM    Albumin 3.5 09/01/2018 01:09 PM    Globulin 3.8 09/01/2018 01:09 PM    A-G Ratio 0.9 09/01/2018 01:09 PM     Coagulation:   Lab Results   Component Value Date/Time    Prothrombin time 14.0 09/01/2018 07:07 PM    INR 1.1 09/01/2018 07:07 PM    aPTT 23.4 09/01/2018 07:07 PM       Assessment:  New onset seizures. Abnormal EEG. Plan:  Considering the seizure, the abnormal CT  From trauma and abnormal EEG I feel compelled to keep him on his medication for now. Return here in about 3 months. Sincerely,        Svetlana Moses.  Jordi Varela M.D.

## 2019-01-29 ENCOUNTER — TELEPHONE (OUTPATIENT)
Dept: INTERNAL MEDICINE CLINIC | Age: 59
End: 2019-01-29

## 2019-01-29 NOTE — TELEPHONE ENCOUNTER
Eendelia from [facility] called asking if Mr Hardik Arias can have at least 30 days worth of his Mellaril until he can get in to his scheduled appointment with Dr Dwain Zazueta?

## 2019-01-30 DIAGNOSIS — F20.3 UNDIFFERENTIATED SCHIZOPHRENIA (HCC): ICD-10-CM

## 2019-01-30 RX ORDER — THIORIDAZINE HYDROCHLORIDE 50 MG/1
TABLET, FILM COATED ORAL
Qty: 150 TAB | Refills: 1 | Status: SHIPPED | OUTPATIENT
Start: 2019-01-30 | End: 2019-02-14 | Stop reason: ALTCHOICE

## 2019-02-07 ENCOUNTER — TELEPHONE (OUTPATIENT)
Dept: INTERNAL MEDICINE CLINIC | Age: 59
End: 2019-02-07

## 2019-02-07 NOTE — TELEPHONE ENCOUNTER
Able to locate Mellaril #150 tabs at [place] in [facility] for patient. Family member stated she would get to this location and  script. Mellaril 50mg, 2 tabs at 8AM and 3 tabs at 3PM daily, called into [facility] in [facility] [place] [address] [phone],  #150 for a 30 day supply. Cx script, [facility] didn't have it after all.

## 2019-02-07 NOTE — TELEPHONE ENCOUNTER
Geronimo William spoke with patient and notified that we were unable to locate [facility], patient will wait for [facility] to get this in.

## 2019-02-14 ENCOUNTER — OFFICE VISIT (OUTPATIENT)
Dept: INTERNAL MEDICINE CLINIC | Age: 59
End: 2019-02-14

## 2019-02-14 VITALS
DIASTOLIC BLOOD PRESSURE: 64 MMHG | TEMPERATURE: 97.7 F | HEIGHT: 70 IN | RESPIRATION RATE: 12 BRPM | OXYGEN SATURATION: 96 % | WEIGHT: 233 LBS | BODY MASS INDEX: 33.36 KG/M2 | HEART RATE: 88 BPM | SYSTOLIC BLOOD PRESSURE: 106 MMHG

## 2019-02-14 DIAGNOSIS — E66.9 OBESITY (BMI 30-39.9): ICD-10-CM

## 2019-02-14 DIAGNOSIS — I26.02 ACUTE SADDLE PULMONARY EMBOLISM WITH ACUTE COR PULMONALE (HCC): Primary | ICD-10-CM

## 2019-02-14 DIAGNOSIS — F79 MENTAL RETARDATION: ICD-10-CM

## 2019-02-14 NOTE — PROGRESS NOTES
Juan Francisco Hall 1960, is a 62 y.o. male, who is seen today for reevaluation of pulmonary embolism, mental retardation with agitation, obesity and history of seizures. He is gotten very loud according to his mother since he has been off Mellaril, Mellaril is no longer available. He has an appointment coming up with his psychiatrist, Dr. Daren Cloud, next month. He is breathing well and having no chest pain. No leg pain and no edema. No seizures. Past Medical History:  
Diagnosis Date  Mental retardation  Overweight(278.02) Current Outpatient Medications Medication Sig Dispense Refill  amitriptyline (ELAVIL) 100 mg tablet 1 tablet at bedtime 90 Tab 1  chlorhexidine (PERIDEX) 0.12 % solution   0  
 levETIRAcetam 1,000 mg tablet Take 1 Tab by mouth two (2) times a day. 180 Tab 1  
 apixaban (ELIQUIS) 5 mg tablet Take 1 Tab by mouth two (2) times a day. 180 Tab 1  
 doxycycline (PERIOSTAT) 20 mg tablet Take 20 mg by mouth two (2) times a day. Visit Vitals /64 (BP 1 Location: Left arm, BP Patient Position: Sitting) Pulse (!) 103 Temp 97.7 °F (36.5 °C) (Oral) Resp 12 Ht 5' 10\" (1.778 m) Wt 233 lb (105.7 kg) SpO2 96% BMI 33.43 kg/m² Carotids are 2+ without bruits. No JVD or HJR. Lungs are clear to percussion. Good breath sounds with no wheezing or crackles. Heart reveals a regular rhythm with normal S1-S2 no murmur gallop click or rub. Apical impulse is not palpable. Abdomen is soft and nontender with no hepatosplenic megaly or masses and no bruits. Extremities reveal no clubbing cyanosis or edema. Pulses are 2+. Calves are nontender. Homans sign is absent. He is speaking much more loudly than usual and talking nonstop today. Assessment: #1.  Multiple pulmonary emboli September 1, he will finish out his current bottle of Eliquis and then discontinue Eliquis.   #2.  Mental retardation with agitation, is certainly a lot louder a lot more active since he is been off Mellaril, Dr. Sita Schwab  will recommends replacement medication if needed next month. #3.  Obesity, recommended he work on weight loss, he and his mother will be working on that. #4.  History of seizures doing well, he will continue Keppra 1000 mg twice a day. Laron Worley, [address] Please note: This document has been produced using voice recognition software. Unrecognized errors in transcription may be present.

## 2019-03-20 NOTE — TELEPHONE ENCOUNTER
Fax received from [address] stating Mellaril still unavailable. Spoke with patient and he is now out of medication. They are requesting something else until this becomes available. Please advise.

## 2019-03-26 RX ORDER — LEVETIRACETAM 1000 MG/1
TABLET ORAL
Qty: 180 TAB | Refills: 1 | Status: SHIPPED | OUTPATIENT
Start: 2019-03-26 | End: 2019-03-29 | Stop reason: CLARIF

## 2019-03-28 ENCOUNTER — TELEPHONE (OUTPATIENT)
Dept: INTERNAL MEDICINE CLINIC | Age: 59
End: 2019-03-28

## 2019-03-28 NOTE — TELEPHONE ENCOUNTER
[facility] is calling regarding Levetiracetam 1000 tab, is currently on backorder, they need to switch it to 500 mg tablets.   Can write a new RX or give verbal approval to [phone] and ask for pharmacist.

## 2019-03-29 RX ORDER — LEVETIRACETAM 500 MG/1
1000 TABLET ORAL 2 TIMES DAILY
Qty: 360 TAB | Refills: 3 | Status: SHIPPED | OUTPATIENT
Start: 2019-03-29 | End: 2019-10-14 | Stop reason: SDUPTHER

## 2019-04-04 ENCOUNTER — DOCUMENTATION ONLY (OUTPATIENT)
Dept: NEUROLOGY | Age: 59
End: 2019-04-04

## 2019-04-15 ENCOUNTER — OFFICE VISIT (OUTPATIENT)
Dept: NEUROLOGY | Age: 59
End: 2019-04-15

## 2019-04-15 VITALS
BODY MASS INDEX: 30.78 KG/M2 | OXYGEN SATURATION: 96 % | TEMPERATURE: 97.7 F | HEIGHT: 70 IN | DIASTOLIC BLOOD PRESSURE: 60 MMHG | HEART RATE: 80 BPM | WEIGHT: 215 LBS | SYSTOLIC BLOOD PRESSURE: 100 MMHG | RESPIRATION RATE: 18 BRPM

## 2019-04-15 DIAGNOSIS — R56.9 SEIZURES (HCC): Primary | ICD-10-CM

## 2019-04-15 NOTE — LETTER
4/15/19 Patient: Misti Bobo YOB: 1960 Date of Visit: 4/15/2019 Kami Diaz MD 
[address] [place] [address] VIA In Basket Dear Kami Diaz MD, Thank you for referring Mr. Misti Bobo to [facility] for evaluation. My notes for this consultation are attached. If you have questions, please do not hesitate to call me. I look forward to following your patient along with you.  
 
 
Sincerely, 
 
Vaibhav Gonzalez MD

## 2019-04-15 NOTE — PATIENT INSTRUCTIONS
Thank you for choosing [facility] and [facility] for your  
 
care. You may receive a survey about your visit. We appreciate you taking time  
 
to complete this survey as we use your feedback to improve our services. We  
 
realize we are not perfect, but we strive to provide excellent care.

## 2019-04-15 NOTE — PROGRESS NOTES
Re:  Magaly Curiel up visit     4/15/2019 11:27 AM 
 
SSN: [id number] Subjective: Navneet Carmichael is seen in follow up, mother's present. No further spells. He's still taking Keppra as prescribed. Medications:   
Current Outpatient Medications Medication Sig Dispense Refill  levETIRAcetam (KEPPRA) 500 mg tablet Take 2 Tabs by mouth two (2) times a day. 360 Tab 3  
 amitriptyline (ELAVIL) 100 mg tablet 1 tablet at bedtime 90 Tab 1  chlorhexidine (PERIDEX) 0.12 % solution   0  
 apixaban (ELIQUIS) 5 mg tablet Take 1 Tab by mouth two (2) times a day. 180 Tab 1  
 doxycycline (PERIOSTAT) 20 mg tablet Take 20 mg by mouth two (2) times a day. Vital signs:   
Visit Vitals /60 (BP 1 Location: Left arm, BP Patient Position: Sitting) Pulse 80 Temp 97.7 °F (36.5 °C) Resp 18 Ht 5' 10\" (1.778 m) Wt 97.5 kg (215 lb) SpO2 96% BMI 30.85 kg/m² Review of Systems: As above otherwise 11 point review of systems negative including;  
Constitutional no fever or chills Skin denies rash or itching HEENT  Denies tinnitus, hearing lose Eyes denies diplopia vision lose Respiratory denies sortness of breath Cardiovascular denies chest pain, dyspnea on exertion Gastrointestinal denies nausea, vomiting, diarrhea, constipation Genitourinary denies incontinence Musculoskeletal denies joint pain or swelling Endocrine denies weight change Hematology denies easy bruising or bleeding Neurological as above in HPI Patient Active Problem List  
Diagnosis Code  Eczema L30.9  Mental retardation F79  Routine general medical examination at a health care facility Z00.00  Obesity (BMI 30-39. 9) E66.9  
 Pulmonary embolus (HCC) I26.99 Objective: The patient is awake, alert, and oriented x 4. Fund of knowledge is poor. Speech is fluent and memory is intact. 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 Cranial Nerves: II  Visual fields are full to confrontation. III, IV, VI  Extraocular movements are intact. There is no nystagmus. V  Facial sensation is intact to pinprick. VII  Face is symmetrical.  VIII - Hearing is present. IX, X, XII  Palate is symmetrical.   XI - Shoulder shrugging and head turning intact Motor: The patient moves all four limbs fairly well and symmetrically. Tone is normal. Reflexes are 2+ and symmetrical. Plantars are down going. Gait is normal. 
 
CBC:  
Lab Results Component Value Date/Time WBC 16.5 (H) 09/01/2018 01:09 PM  
 RBC 4.48 (L) 09/01/2018 01:09 PM  
 HGB 13.4 09/01/2018 01:09 PM  
 HCT 38.8 09/01/2018 01:09 PM  
 PLATELET 906 61/49/4487 01:09 PM  
 
BMP:  
Lab Results Component Value Date/Time Glucose 136 (H) 09/01/2018 01:09 PM  
 Sodium 139 09/01/2018 01:09 PM  
 Potassium 4.6 09/01/2018 01:09 PM  
 Chloride 104 09/01/2018 01:09 PM  
 CO2 24 09/01/2018 01:09 PM  
 BUN 15 09/01/2018 01:09 PM  
 Creatinine 1.16 09/01/2018 01:09 PM  
 Calcium 9.0 09/01/2018 01:09 PM  
 
CMP:  
Lab Results Component Value Date/Time Glucose 136 (H) 09/01/2018 01:09 PM  
 Sodium 139 09/01/2018 01:09 PM  
 Potassium 4.6 09/01/2018 01:09 PM  
 Chloride 104 09/01/2018 01:09 PM  
 CO2 24 09/01/2018 01:09 PM  
 BUN 15 09/01/2018 01:09 PM  
 Creatinine 1.16 09/01/2018 01:09 PM  
 Calcium 9.0 09/01/2018 01:09 PM  
 Anion gap 11 09/01/2018 01:09 PM  
 BUN/Creatinine ratio 13 09/01/2018 01:09 PM  
 Alk. phosphatase 120 (H) 09/01/2018 01:09 PM  
 Protein, total 7.3 09/01/2018 01:09 PM  
 Albumin 3.5 09/01/2018 01:09 PM  
 Globulin 3.8 09/01/2018 01:09 PM  
 A-G Ratio 0.9 09/01/2018 01:09 PM  
 
Coagulation:  
Lab Results Component Value Date/Time Prothrombin time 14.0 09/01/2018 07:07 PM  
 INR 1.1 09/01/2018 07:07 PM  
 aPTT 23.4 09/01/2018 07:07 PM  
 
 
Assessment:  New onset seizures. Abnormal EEG.  
 
Plan:  Considering the seizure, the abnormal CT  From trauma and abnormal EEG I feel compelled to keep him on his medication for now. Return here in about 6 months. Sincerely, 
 
 
 
Ephraim Garcia.  Melba Ramos M.D.

## 2019-07-18 ENCOUNTER — OFFICE VISIT (OUTPATIENT)
Dept: INTERNAL MEDICINE CLINIC | Age: 59
End: 2019-07-18

## 2019-07-18 VITALS
WEIGHT: 191.6 LBS | TEMPERATURE: 98.5 F | OXYGEN SATURATION: 96 % | BODY MASS INDEX: 27.43 KG/M2 | RESPIRATION RATE: 12 BRPM | HEIGHT: 70 IN | SYSTOLIC BLOOD PRESSURE: 120 MMHG | HEART RATE: 92 BPM | DIASTOLIC BLOOD PRESSURE: 80 MMHG

## 2019-07-18 DIAGNOSIS — R63.4 WEIGHT LOSS: ICD-10-CM

## 2019-07-18 DIAGNOSIS — I10 PRIMARY HYPERTENSION: ICD-10-CM

## 2019-07-18 DIAGNOSIS — Z86.711 HISTORY OF PULMONARY EMBOLISM: ICD-10-CM

## 2019-07-18 DIAGNOSIS — F79 INTELLECTUAL DISABILITY: Primary | ICD-10-CM

## 2019-07-18 NOTE — PROGRESS NOTES
Lazarus Shackleton 1960, is a 62 y.o. male, who is seen today for reevaluation of obesity, seizure disorder, intellectual disability, dry skin. Skin is actually better with current emollient use. His mother is afraid he is lost too much weight but weight is improved but still with a body mass index of 27.5, he can stand to lose a few more pounds. He will be working on that. He has not had any seizures for a long time and he continues Keppra 1000 mg twice a day. He sees a psychiatrist and they have him on amitriptyline 100 mg at bedtime. He has a history of a large pulmonary embolism was a year ago and is still on Eliquis 5 mg twice a day. Past Medical History:   Diagnosis Date    Mental retardation     Overweight(278.02)      Current Outpatient Medications   Medication Sig Dispense Refill    levETIRAcetam (KEPPRA) 500 mg tablet Take 2 Tabs by mouth two (2) times a day. 360 Tab 3    amitriptyline (ELAVIL) 100 mg tablet 1 tablet at bedtime 90 Tab 1    chlorhexidine (PERIDEX) 0.12 % solution   0    apixaban (ELIQUIS) 5 mg tablet Take 1 Tab by mouth two (2) times a day. 180 Tab 1    doxycycline (PERIOSTAT) 20 mg tablet Take 20 mg by mouth two (2) times a day. Visit Vitals  /80 (BP 1 Location: Left arm, BP Patient Position: Sitting)   Pulse 92   Temp 98.5 °F (36.9 °C) (Oral)   Resp 12   Ht 5' 10\" (1.778 m)   Wt 191 lb 9.6 oz (86.9 kg)   SpO2 96%   BMI 27.49 kg/m²     Carotids are 2+ without bruits. Lungs are clear to percussion. Good breath sounds with no wheezing or crackles. Heart reveals a regular rhythm with normal S1 and S2 no murmur gallop click or rub. Apical impulse is not palpable. Abdomen soft and nontender with no hepatospleno megaly or masses and no bruits. Extremities reveal no clubbing cyanosis or edema. Skin is not optically dry currently. Assessment: #1. History of seizure disorder doing well, he will continue Keppra 1000 mg twice a day.   #2.  Dry skin, he will continue current emollient. #3.  Weight loss but he has a much better weight and not underweight, I have encouraged him and his mother to allow him to lose a few more pounds over the next 6 months. #4.  Intellectual disability, quite boisterous, he was not himself for about 3 months but now just the last few days he is better, medication had been changed by his psychiatrist about 3 months ago. His mother will call his psychiatrist if he returns to the undesirable habits. Follow-up 6 months for complete evaluation or sooner if needed    Laron Liang MD FACP    Please note: This document has been produced using voice recognition software. Unrecognized errors in transcription may be present.

## 2019-08-26 RX ORDER — AMITRIPTYLINE HYDROCHLORIDE 100 MG/1
100 TABLET, FILM COATED ORAL
Qty: 90 TAB | Refills: 1 | Status: SHIPPED | OUTPATIENT
Start: 2019-08-26 | End: 2020-03-03 | Stop reason: SDUPTHER

## 2019-08-26 NOTE — TELEPHONE ENCOUNTER
Last Visit: 7/18/19 with MD Cortes Montano  Next Appointment: 2/3/19 with MD Cortes Montano  Previous Refill Encounter(s): 1/16/19 #90 with 1 refill    Requested Prescriptions     Pending Prescriptions Disp Refills    amitriptyline (ELAVIL) 100 mg tablet 90 Tab 1     Sig: Take 1 Tab by mouth nightly.

## 2019-10-14 ENCOUNTER — OFFICE VISIT (OUTPATIENT)
Dept: NEUROLOGY | Age: 59
End: 2019-10-14

## 2019-10-14 VITALS
BODY MASS INDEX: 25.62 KG/M2 | HEIGHT: 70 IN | WEIGHT: 179 LBS | RESPIRATION RATE: 20 BRPM | TEMPERATURE: 97.8 F | OXYGEN SATURATION: 95 % | HEART RATE: 93 BPM | SYSTOLIC BLOOD PRESSURE: 122 MMHG | DIASTOLIC BLOOD PRESSURE: 60 MMHG

## 2019-10-14 DIAGNOSIS — R56.9 SEIZURES (HCC): Primary | ICD-10-CM

## 2019-10-14 RX ORDER — PERPHENAZINE 8 MG/1
TABLET, FILM COATED ORAL
Refills: 5 | COMMUNITY
Start: 2019-10-07 | End: 2020-02-03 | Stop reason: ALTCHOICE

## 2019-10-14 RX ORDER — LEVETIRACETAM 500 MG/1
1000 TABLET ORAL 2 TIMES DAILY
Qty: 360 TAB | Refills: 3 | Status: SHIPPED | OUTPATIENT
Start: 2019-10-14 | End: 2020-11-04 | Stop reason: SDUPTHER

## 2019-10-14 NOTE — PROGRESS NOTES
Re:  Ranjit Berg up visit     10/14/2019 11:19 AM    SSN: [id number]    Subjective: Arleen Buckley is seen in follow up, mother's present. No further spells. He's still taking Keppra as prescribed. He's seizure free since September 2018. His mother is convinced he didn't have any seizures originally. Medications:    Current Outpatient Medications   Medication Sig Dispense Refill    amitriptyline (ELAVIL) 100 mg tablet Take 1 Tab by mouth nightly. 90 Tab 1    levETIRAcetam (KEPPRA) 500 mg tablet Take 2 Tabs by mouth two (2) times a day. 360 Tab 3    apixaban (ELIQUIS) 5 mg tablet Take 1 Tab by mouth two (2) times a day. 180 Tab 1    doxycycline (PERIOSTAT) 20 mg tablet Take 20 mg by mouth two (2) times a day.  perphenazine (TRILAFON) 8 mg tablet TAKE 1 TABLET BY MOUTH 2 TIMES A DAY  5    chlorhexidine (PERIDEX) 0.12 % solution   0       Vital signs:    Visit Vitals   5' 10\" (1.778 m)   BMI 27.49 kg/m²       Review of Systems:   As above otherwise 11 point review of systems negative including;   Constitutional no fever or chills  Skin denies rash or itching  HEENT  Denies tinnitus, hearing lose  Eyes denies diplopia vision lose  Respiratory denies sortness of breath  Cardiovascular denies chest pain, dyspnea on exertion  Gastrointestinal denies nausea, vomiting, diarrhea, constipation  Genitourinary denies incontinence  Musculoskeletal denies joint pain or swelling  Endocrine denies weight change  Hematology denies easy bruising or bleeding   Neurological as above in HPI      Patient Active Problem List   Diagnosis Code    Eczema L30.9    Intellectual disability F68    Routine general medical examination at a health care facility Z00.00    Pulmonary embolus ([facility] [address] I26.99    History of pulmonary embolism Z86.711         Objective: The patient is awake, alert, and oriented x 4. Fund of knowledge is poor. Speech is fluent and memory is intact.   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 Cranial Nerves: II - Visual fields are full to confrontation. III, IV, VI - Extraocular movements are intact. There is no nystagmus. V - Facial sensation is intact to pinprick. VII - Face is symmetrical.  VIII - Hearing is present. IX, X, XII - Palate is symmetrical.   XI - Shoulder shrugging and head turning intact  Motor: The patient moves all four limbs fairly well and symmetrically. Tone is normal. Reflexes are 2+ and symmetrical. Plantars are down going. Gait is normal.    CBC:   Lab Results   Component Value Date/Time    WBC 16.5 (H) 09/01/2018 01:09 PM    RBC 4.48 (L) 09/01/2018 01:09 PM    HGB 13.4 09/01/2018 01:09 PM    HCT 38.8 09/01/2018 01:09 PM    PLATELET 741 85/06/8783 01:09 PM     BMP:   Lab Results   Component Value Date/Time    Glucose 136 (H) 09/01/2018 01:09 PM    Sodium 139 09/01/2018 01:09 PM    Potassium 4.6 09/01/2018 01:09 PM    Chloride 104 09/01/2018 01:09 PM    CO2 24 09/01/2018 01:09 PM    BUN 15 09/01/2018 01:09 PM    Creatinine 1.16 09/01/2018 01:09 PM    Calcium 9.0 09/01/2018 01:09 PM     CMP:   Lab Results   Component Value Date/Time    Glucose 136 (H) 09/01/2018 01:09 PM    Sodium 139 09/01/2018 01:09 PM    Potassium 4.6 09/01/2018 01:09 PM    Chloride 104 09/01/2018 01:09 PM    CO2 24 09/01/2018 01:09 PM    BUN 15 09/01/2018 01:09 PM    Creatinine 1.16 09/01/2018 01:09 PM    Calcium 9.0 09/01/2018 01:09 PM    Anion gap 11 09/01/2018 01:09 PM    BUN/Creatinine ratio 13 09/01/2018 01:09 PM    Alk. phosphatase 120 (H) 09/01/2018 01:09 PM    Protein, total 7.3 09/01/2018 01:09 PM    Albumin 3.5 09/01/2018 01:09 PM    Globulin 3.8 09/01/2018 01:09 PM    A-G Ratio 0.9 09/01/2018 01:09 PM     Coagulation:   Lab Results   Component Value Date/Time    Prothrombin time 14.0 09/01/2018 07:07 PM    INR 1.1 09/01/2018 07:07 PM    aPTT 23.4 09/01/2018 07:07 PM       Assessment:  New onset seizures. Abnormal EEG.     Plan:  Considering the seizure, the abnormal CT from trauma and abnormal EEG I feel compelled to keep him on his medication for now. Return here in about 6 months. Sincerely,        Michelle Mccartney.  Indu Varma M.D.

## 2019-10-14 NOTE — PROGRESS NOTES
Yocasta Nuñez is a 62 y.o. male in today for follow-up on seizures. 1. Have you been to the ER, urgent care clinic since your last visit? Hospitalized since your last visit? No    2. Have you seen or consulted any other health care providers outside of the [address] [place] [address] since your last visit? Include any pap smears or colon screening.  No

## 2020-01-27 ENCOUNTER — HOSPITAL ENCOUNTER (OUTPATIENT)
Dept: LAB | Age: 60
Discharge: HOME | End: 2020-01-27
Payer: MEDICARE

## 2020-01-27 LAB
ALBUMIN SERPL-MCNC: 3.8 G/DL (ref 3.4–5)
ALBUMIN/GLOB SERPL: 1.3 {RATIO} (ref 0.8–1.7)
ALP SERPL-CCNC: 94 U/L (ref 45–117)
ALT SERPL-CCNC: 30 U/L (ref 16–61)
ANION GAP SERPL CALC-SCNC: 3 MMOL/L (ref 3–18)
AST SERPL-CCNC: 15 U/L (ref 10–38)
BASOPHILS # BLD: 0 K/UL (ref 0–0.1)
BASOPHILS NFR BLD: 0 % (ref 0–2)
BILIRUB SERPL-MCNC: 0.5 MG/DL (ref 0.2–1)
BUN SERPL-MCNC: 10 MG/DL (ref 7–18)
BUN/CREAT SERPL: 14 (ref 12–20)
CALCIUM SERPL-MCNC: 9.3 MG/DL (ref 8.5–10.1)
CHLORIDE SERPL-SCNC: 108 MMOL/L (ref 100–111)
CHOLEST SERPL-MCNC: 139 MG/DL
CO2 SERPL-SCNC: 32 MMOL/L (ref 21–32)
CREAT SERPL-MCNC: 0.72 MG/DL (ref 0.6–1.3)
DIFFERENTIAL METHOD BLD: NORMAL
EOSINOPHIL # BLD: 0.2 K/UL (ref 0–0.4)
EOSINOPHIL NFR BLD: 4 % (ref 0–5)
ERYTHROCYTE [DISTWIDTH] IN BLOOD BY AUTOMATED COUNT: 12.5 % (ref 11.6–14.5)
GLOBULIN SER CALC-MCNC: 2.9 G/DL (ref 2–4)
GLUCOSE SERPL-MCNC: 89 MG/DL (ref 74–99)
HCT VFR BLD AUTO: 44.9 % (ref 36–48)
HDLC SERPL-MCNC: 52 MG/DL (ref 40–60)
HDLC SERPL: 2.7 {RATIO} (ref 0–5)
HGB BLD-MCNC: 14.4 G/DL (ref 13–16)
LDLC SERPL CALC-MCNC: 74.6 MG/DL (ref 0–100)
LIPID PROFILE,FLP: NORMAL
LYMPHOCYTES # BLD: 1.6 K/UL (ref 0.9–3.6)
LYMPHOCYTES NFR BLD: 23 % (ref 21–52)
MCH RBC QN AUTO: 30.6 PG (ref 24–34)
MCHC RBC AUTO-ENTMCNC: 32.1 G/DL (ref 31–37)
MCV RBC AUTO: 95.5 FL (ref 74–97)
MONOCYTES # BLD: 0.7 K/UL (ref 0.05–1.2)
MONOCYTES NFR BLD: 10 % (ref 3–10)
NEUTS SEG # BLD: 4.4 K/UL (ref 1.8–8)
NEUTS SEG NFR BLD: 63 % (ref 40–73)
PLATELET # BLD AUTO: 194 K/UL (ref 135–420)
PMV BLD AUTO: 11 FL (ref 9.2–11.8)
POTASSIUM SERPL-SCNC: 4.7 MMOL/L (ref 3.5–5.5)
PROT SERPL-MCNC: 6.7 G/DL (ref 6.4–8.2)
RBC # BLD AUTO: 4.7 M/UL (ref 4.7–5.5)
SODIUM SERPL-SCNC: 143 MMOL/L (ref 136–145)
TRIGL SERPL-MCNC: 62 MG/DL (ref ?–150)
VLDLC SERPL CALC-MCNC: 12.4 MG/DL
WBC # BLD AUTO: 6.9 K/UL (ref 4.6–13.2)

## 2020-01-27 PROCEDURE — 80061 LIPID PANEL: CPT

## 2020-01-27 PROCEDURE — 80053 COMPREHEN METABOLIC PANEL: CPT

## 2020-01-27 PROCEDURE — 36415 COLL VENOUS BLD VENIPUNCTURE: CPT

## 2020-01-27 PROCEDURE — 85025 COMPLETE CBC W/AUTO DIFF WBC: CPT

## 2020-02-03 ENCOUNTER — TELEPHONE (OUTPATIENT)
Dept: INTERNAL MEDICINE CLINIC | Age: 60
End: 2020-02-03

## 2020-02-03 ENCOUNTER — OFFICE VISIT (OUTPATIENT)
Dept: INTERNAL MEDICINE CLINIC | Age: 60
End: 2020-02-03

## 2020-02-03 VITALS
DIASTOLIC BLOOD PRESSURE: 60 MMHG | OXYGEN SATURATION: 100 % | BODY MASS INDEX: 26.8 KG/M2 | WEIGHT: 187.2 LBS | SYSTOLIC BLOOD PRESSURE: 112 MMHG | RESPIRATION RATE: 12 BRPM | TEMPERATURE: 97.5 F | HEART RATE: 88 BPM | HEIGHT: 70 IN

## 2020-02-03 DIAGNOSIS — F79 INTELLECTUAL DISABILITY: ICD-10-CM

## 2020-02-03 DIAGNOSIS — Z86.711 HISTORY OF PULMONARY EMBOLISM: ICD-10-CM

## 2020-02-03 DIAGNOSIS — Z00.00 ROUTINE GENERAL MEDICAL EXAMINATION AT A HEALTH CARE FACILITY: Primary | ICD-10-CM

## 2020-02-03 NOTE — TELEPHONE ENCOUNTER
Address: [address] [place] [address], [facility] [address]   Phone: [phone]      Left message

## 2020-02-03 NOTE — PROGRESS NOTES
Jasvir Coker Jr.,born 1960, is a 61 y.o. male, who is seen today for   A routine physical exam and follow-up on history of overweight, seizures, pulmonary embolism and psychiatric disorder. He has been doing well for many years with the use of thioridazine and amitriptyline but when thioridazine became no longer available he was tried on perphenazine which caused side effects so that was stopped. He has seen Dr. Alix Rowell but then most recently saw apparently 3 of the PAs and the patient's  mother said she really does not have confidence in that practitioner. He is much more boisterous than he used to be but is always talk very loud here in the office. He is resting well at night. He was overweight and he went on a diet and did lose weight and has lost another 4 pounds over the last 6 months. He also sees Dr. Irina Weiss and uses Wilmar Cure regularly, no seizures. Past Medical History:   Diagnosis Date    Mental retardation     Overweight(278.02)      History reviewed. No pertinent surgical history. Current Outpatient Medications   Medication Sig Dispense Refill    levETIRAcetam (KEPPRA) 500 mg tablet Take 2 Tabs by mouth two (2) times a day. 360 Tab 3    amitriptyline (ELAVIL) 100 mg tablet Take 1 Tab by mouth nightly.  90 Tab 1    chlorhexidine (PERIDEX) 0.12 % solution   0     No Known Allergies  Social History     Socioeconomic History    Marital status: SINGLE     Spouse name: Not on file    Number of children: Not on file    Years of education: Not on file    Highest education level: Not on file   Tobacco Use    Smoking status: Never Smoker    Smokeless tobacco: Never Used   Substance and Sexual Activity    Alcohol use: No    Drug use: No    Sexual activity: Never     Visit Vitals  /60 (BP 1 Location: Left arm, BP Patient Position: Sitting)   Pulse 88   Temp 97.5 °F (36.4 °C) (Oral)   Resp 12   Ht 5' 10\" (1.778 m)   Wt 187 lb 3.2 oz (84.9 kg)   SpO2 100%   BMI 26.86 kg/m²     Ear canals and tympanic membranes appear normal, good light reflex from the tympanic membranes. Oral cavity reveals no lesions. Neck reveals no adenopathy or thyromegaly. Carotids are 2+ without bruits. Lungs are clear to percussion. Good breath sounds with no wheezing or crackles. Heart reveals a regular rhythm with normal S1 and S2 no apical impulse is in the fifth interspace at the midclavicular line. Abdomen is soft and nontender with no hepatosplenomegaly or masses and no bruits. Extremities reveal no clubbing cyanosis or edema. Pulses are 2+. Results for orders placed or performed during the hospital encounter of 01/27/20   CBC WITH AUTOMATED DIFF   Result Value Ref Range    WBC 6.9 4.6 - 13.2 K/uL    RBC 4.70 4.70 - 5.50 M/uL    HGB 14.4 13.0 - 16.0 g/dL    HCT 44.9 36.0 - 48.0 %    MCV 95.5 74.0 - 97.0 FL    MCH 30.6 24.0 - 34.0 PG    MCHC 32.1 31.0 - 37.0 g/dL    RDW 12.5 11.6 - 14.5 %    PLATELET 459 224 - 150 K/uL    MPV 11.0 9.2 - 11.8 FL    NEUTROPHILS 63 40 - 73 %    LYMPHOCYTES 23 21 - 52 %    MONOCYTES 10 3 - 10 %    EOSINOPHILS 4 0 - 5 %    BASOPHILS 0 0 - 2 %    ABS. NEUTROPHILS 4.4 1.8 - 8.0 K/UL    ABS. LYMPHOCYTES 1.6 0.9 - 3.6 K/UL    ABS. MONOCYTES 0.7 0.05 - 1.2 K/UL    ABS. EOSINOPHILS 0.2 0.0 - 0.4 K/UL    ABS.  BASOPHILS 0.0 0.0 - 0.1 K/UL    DF AUTOMATED     LIPID PANEL   Result Value Ref Range    LIPID PROFILE          Cholesterol, total 139 <200 MG/DL    Triglyceride 62 <150 MG/DL    HDL Cholesterol 52 40 - 60 MG/DL    LDL, calculated 74.6 0 - 100 MG/DL    VLDL, calculated 12.4 MG/DL    CHOL/HDL Ratio 2.7 0 - 5.0     METABOLIC PANEL, COMPREHENSIVE   Result Value Ref Range    Sodium 143 136 - 145 mmol/L    Potassium 4.7 3.5 - 5.5 mmol/L    Chloride 108 100 - 111 mmol/L    CO2 32 21 - 32 mmol/L    Anion gap 3 3.0 - 18 mmol/L    Glucose 89 74 - 99 mg/dL    BUN 10 7.0 - 18 MG/DL    Creatinine 0.72 0.6 - 1.3 MG/DL    BUN/Creatinine ratio 14 12 - 20      GFR est AA >60 >60 ml/min/1.73m2    GFR est non-AA >60 >60 ml/min/1.73m2    Calcium 9.3 8.5 - 10.1 MG/DL    Bilirubin, total 0.5 0.2 - 1.0 MG/DL    ALT (SGPT) 30 16 - 61 U/L    AST (SGOT) 15 10 - 38 U/L    Alk. phosphatase 94 45 - 117 U/L    Protein, total 6.7 6.4 - 8.2 g/dL    Albumin 3.8 3.4 - 5.0 g/dL    Globulin 2.9 2.0 - 4.0 g/dL    A-G Ratio 1.3 0.8 - 1.7       Assessment: #1. Intellectual disability stable but boisterous, he will continue amitriptyline 100 mg at bedtime for now but I have recommended strongly that his mother make an appointment for him to see Dr. Bertin Caballero to see if thioridazine can be replaced with something that works well for him and that he tolerates. #2.  History of pulmonary embolism about 18 months ago, he has been off Eliquis for almost a year and has had no recurrence of DVT or PE. #3.  Previously overweight and now minimally overweight but not to the point where it is impacting his health, he will maintain current weight or lose just a few more pounds. #4.  History of apparent seizures, he will continue Keppra 1000 mg twice a day and follow-up with Dr. Dennis Porter. Follow-up here as needed, he really does not need a checkup from my standpoint for at least a year and I will be retiring this summer so we will recommend someone for him as his next physical time approaches. Laron Russell MD FACP    Please note: This document has been produced using voice recognition software. Unrecognized errors in transcription may be present.

## 2020-02-03 NOTE — TELEPHONE ENCOUNTER
At check out mother said RM told her to schedule appt with psych. Says RM told her a name but she can't remember what it is. I gave her name in chart back in January but she said she didn't think that was it. Please advise and tell mother.

## 2020-03-03 RX ORDER — AMITRIPTYLINE HYDROCHLORIDE 100 MG/1
100 TABLET, FILM COATED ORAL
Qty: 90 TAB | Refills: 1 | Status: SHIPPED | OUTPATIENT
Start: 2020-03-03 | End: 2020-11-03 | Stop reason: SDUPTHER

## 2020-03-03 NOTE — TELEPHONE ENCOUNTER
Last visit 02/03/2020 MD Latoya Ybarra   Next appointment None   Previous refill encounter(s) 08/26/2019 Elavil  #90 with 1 refill     Requested Prescriptions     Pending Prescriptions Disp Refills    amitriptyline (ELAVIL) 100 mg tablet 90 Tab 1     Sig: Take 1 Tab by mouth nightly.

## 2020-11-03 PROBLEM — G40.909 SEIZURE DISORDER (HCC): Status: ACTIVE | Noted: 2020-11-03

## 2020-11-03 RX ORDER — AMITRIPTYLINE HYDROCHLORIDE 100 MG/1
100 TABLET, FILM COATED ORAL
Qty: 15 TAB | Refills: 0 | Status: SHIPPED | OUTPATIENT
Start: 2020-11-03 | End: 2020-11-04 | Stop reason: SDUPTHER

## 2020-11-03 NOTE — TELEPHONE ENCOUNTER
Last Visit: 2/3/20 with MD René Queen  Next Appointment: 11/4/20 with new provider  Previous Refill Encounter(s): 3/3/20 #90 with 1 refill    Requested Prescriptions     Pending Prescriptions Disp Refills    amitriptyline (ELAVIL) 100 mg tablet 15 Tab 0     Sig: Take 1 Tab by mouth nightly.

## 2020-11-04 ENCOUNTER — VIRTUAL VISIT (OUTPATIENT)
Dept: FAMILY MEDICINE CLINIC | Age: 60
End: 2020-11-04
Payer: COMMERCIAL

## 2020-11-04 DIAGNOSIS — Z13.220 SCREENING FOR LIPID DISORDERS: ICD-10-CM

## 2020-11-04 DIAGNOSIS — Z86.711 HISTORY OF PULMONARY EMBOLISM: ICD-10-CM

## 2020-11-04 DIAGNOSIS — G40.909 SEIZURE DISORDER (HCC): Primary | ICD-10-CM

## 2020-11-04 DIAGNOSIS — Z12.11 SCREENING FOR COLON CANCER: ICD-10-CM

## 2020-11-04 DIAGNOSIS — F79 INTELLECTUAL DISABILITY: ICD-10-CM

## 2020-11-04 DIAGNOSIS — Z11.59 NEED FOR HEPATITIS C SCREENING TEST: ICD-10-CM

## 2020-11-04 PROCEDURE — 99443 PR PHYS/QHP TELEPHONE EVALUATION 21-30 MIN: CPT | Performed by: NURSE PRACTITIONER

## 2020-11-04 RX ORDER — LEVETIRACETAM 500 MG/1
1000 TABLET ORAL 2 TIMES DAILY
Qty: 60 TAB | Refills: 0 | Status: SHIPPED | OUTPATIENT
Start: 2020-11-04 | End: 2020-11-04

## 2020-11-04 RX ORDER — LEVETIRACETAM 500 MG/1
1000 TABLET ORAL 2 TIMES DAILY
Qty: 60 TAB | Refills: 0 | Status: SHIPPED | OUTPATIENT
Start: 2020-11-04 | End: 2020-11-16 | Stop reason: SDUPTHER

## 2020-11-04 RX ORDER — AMITRIPTYLINE HYDROCHLORIDE 100 MG/1
100 TABLET, FILM COATED ORAL
Qty: 30 TAB | Refills: 0 | Status: SHIPPED | OUTPATIENT
Start: 2020-11-04 | End: 2020-11-04

## 2020-11-04 RX ORDER — AMITRIPTYLINE HYDROCHLORIDE 100 MG/1
100 TABLET, FILM COATED ORAL
Qty: 30 TAB | Refills: 0 | Status: SHIPPED | OUTPATIENT
Start: 2020-11-04 | End: 2020-12-21 | Stop reason: SDUPTHER

## 2020-11-04 NOTE — PROGRESS NOTES
Pj Asher presents today for   Chief Complaint   Patient presents with   [address]. preferred language for health care discussion is english/other. Is someone accompanying this pt? Yes, caregiver, Herson Daja  Is the patient using any DME equipment during [address]? no    Depression Screening:  3 most recent PHQ Screens 11/4/2020   Little interest or pleasure in doing things Not at all   Feeling down, depressed, irritable, or hopeless Not at all   Total Score PHQ 2 0       Learning Assessment:  Learning Assessment 11/4/2020   PRIMARY LEARNER Patient   HIGHEST LEVEL OF EDUCATION - PRIMARY LEARNER  GRADUATED HIGH SCHOOL OR GED   BARRIERS PRIMARY LEARNER [address] CAREGIVER Yes   CO-LEARNER NAME Nicolas Coto   CO-LEARNER HIGHEST LEVEL OF EDUCATION 2 YEARS OF COLLEGE   BARRIERS CO-LEARNER NONE   PRIMARY LANGUAGE ENGLISH   PRIMARY LANGUAGE CO-LEARNER ENGLISH    NEED No   LEARNER PREFERENCE PRIMARY DEMONSTRATION   LEARNER PREFERENCE CO-LEARNER DEMONSTRATION   ANSWERED BY Herson Farnsworth   RELATIONSHIP OTHER       Abuse Screening:  Abuse Screening Questionnaire 11/4/2020   Do you ever feel afraid of your partner? N   Are you in a relationship with someone who physically or mentally threatens you? N   Is it safe for you to go home? Y       Generalized Anxiety  No flowsheet data found. Health Maintenance Due   Topic Date Due    Hepatitis C Screening  1960    DTaP/Tdap/Td series (1 - Tdap) 12/25/1981    Shingrix Vaccine Age 50> (1 of 2) 12/25/2010    Colorectal Cancer Screening Combo  12/25/2010    Flu Vaccine (1) 09/01/2020   . Health Maintenance reviewed and discussed and ordered per Provider. Advance Directive:  1. Do you have an advance directive in place?  Patient Reply:no

## 2020-11-05 ENCOUNTER — TELEPHONE (OUTPATIENT)
Dept: FAMILY MEDICINE CLINIC | Age: 60
End: 2020-11-05

## 2020-11-05 NOTE — TELEPHONE ENCOUNTER
----- Message from Anu Jonas NP sent at 11/4/2020 11:29 AM EST -----  Regarding: Follow-up  Please schedule this patient for a virtual follow-up in 4 weeks to review lab results. Thank you.

## 2020-11-16 ENCOUNTER — TELEPHONE (OUTPATIENT)
Dept: FAMILY MEDICINE CLINIC | Age: 60
End: 2020-11-16

## 2020-11-16 DIAGNOSIS — G40.909 SEIZURE DISORDER (HCC): ICD-10-CM

## 2020-11-16 RX ORDER — LEVETIRACETAM 500 MG/1
1000 TABLET ORAL 2 TIMES DAILY
Qty: 60 TAB | Refills: 0 | Status: SHIPPED | OUTPATIENT
Start: 2020-11-16 | End: 2020-12-01 | Stop reason: SDUPTHER

## 2020-12-01 ENCOUNTER — OFFICE VISIT (OUTPATIENT)
Dept: NEUROLOGY | Age: 60
End: 2020-12-01
Payer: COMMERCIAL

## 2020-12-01 VITALS
RESPIRATION RATE: 16 BRPM | HEIGHT: 70 IN | HEART RATE: 87 BPM | OXYGEN SATURATION: 97 % | TEMPERATURE: 96.2 F | SYSTOLIC BLOOD PRESSURE: 104 MMHG | WEIGHT: 192.8 LBS | BODY MASS INDEX: 27.6 KG/M2 | DIASTOLIC BLOOD PRESSURE: 58 MMHG

## 2020-12-01 DIAGNOSIS — R56.9 SEIZURES (HCC): Primary | ICD-10-CM

## 2020-12-01 PROCEDURE — 99214 OFFICE O/P EST MOD 30 MIN: CPT | Performed by: NURSE PRACTITIONER

## 2020-12-01 RX ORDER — LEVETIRACETAM 500 MG/1
TABLET ORAL
Qty: 120 TAB | Refills: 6 | Status: SHIPPED | OUTPATIENT
Start: 2020-12-01 | End: 2020-12-03 | Stop reason: SDUPTHER

## 2020-12-01 NOTE — PATIENT INSTRUCTIONS
Patient instructions: 
-Keppra 500 mg- 1 tab by mouth twice daily. After 2 weeks increase to 2 tabs twice daily.  
-Call if side effects or mood changes 
-Follow up here in 6 months 
-Blood work in the system from primary care provider and then follow up with her.

## 2020-12-01 NOTE — PROGRESS NOTES
[facility]  [address], [address], [place], [address]  [address] V[place]ana, [address]  Office:  [phone]  Fax: [phone]  Chief Complaint   Patient presents with    Follow-up     Seizures       HPI: Kenroy Nava. presents in follow-up for seizures. He was last seen here on 10/14/2019 by Dr. Chance Berg. It was noted that he has had no further spells. He was taking Keppra as prescribed. He has been seizure-free since September 2018. It was noted that his mother believed that he did not have any seizures initially. He was on a regimen of Keppra 1000 mg twice daily. He has had a history of abnormal EEG and an abnormal CT from trauma so it was noted that this is why the medication was continued. He was initially seen here in November 2018 for a single seizure which occurred in September 2018. He had a spontaneous PE in September 2018. This was associated with a syncopal event. During the syncopal event he was noted to have a brief tonic-clonic seizure. According to his mother he never had a seizure before that episode. He was discharged from the hospital and she was not sure why he was on the medication so it was stopped and then he had a seizure a week after the discontinuation of the drug. Since then he was placed back on the [employer] [address] and has not had any convulsive activity since. No side effects on the medication. He is on Mellaril and Elavil from his psychiatry nurse practitioner. The CT of the head from 9/1/2018 noted scalp laceration with staples in the left frontal area and a suspect small area of parenchymal hemorrhagic contusion at the left frontal lobe. EEG from 12/11/2018 reported relatively frequent left temporal sharp transients raising concern for left temporal epileptiform focus. He presents today in follow-up. He is here with his friend Braydon Rowland. Braydon Rowland and his partner Dayan Rashid are his guardians.   His mother has been having problems with her memory. There have been no more seizures. Nicolas notes that the patient is no longer taking Keppra. They're unsure. It was ordered by his PCP on 11/4/2020 and the note states to follow up here for further refills. It was noted that he was going to be out of Keppra in a few days. Paul Guan says he's only on Mellaril and Elavil. Then reports being off Keppra probably about a year. Nicolas reports there were no side effects on the medication. He reports issues with his mood but that he's always been like this. Past Medical History:   Diagnosis Date    Mental retardation     Overweight(278.02)        History reviewed. No pertinent surgical history. Current Outpatient Medications   Medication Sig Dispense Refill    levETIRAcetam (KEPPRA) 500 mg tablet Take one tab by mouth twice daily. After 2 weeks, increase to 2 tabs twice daily. 120 Tab 6    amitriptyline (ELAVIL) 100 mg tablet Take 1 Tab by mouth nightly. 30 Tab 0    chlorhexidine (PERIDEX) 0.12 % solution   0        No Known Allergies    Social History     Tobacco Use    Smoking status: Never Smoker    Smokeless tobacco: Never Used   Substance Use Topics    Alcohol use: No    Drug use: No       History reviewed. No pertinent family history. Review of Systems:  GENERAL: Denies fever or fatigue  CARDIAC: No CP or SOB  PULMONARY: No cough or SOB  MUSCULOSKELETAL: No new joint pain  NEURO: SEE HPI    Physical Examination:  Visit Vitals  BP (!) 104/58 (BP 1 Location: Left arm, BP Patient Position: Sitting)   Pulse 87   Temp (!) 96.2 °F (35.7 °C) (Temporal)   Resp 16   Ht 5' 10\" (1.778 m)   Wt 87.5 kg (192 lb 12.8 oz)   SpO2 97%   BMI 27.66 kg/m²       Alert, in NAD. Heart is regular. Oriented x3. Animated, looking at a piece of paper with something handwritten on it, reading off the paper frequently. Speech is fluent. Fund of knowledge is poor. He has had cognitive trouble since birth. EOMs are full, PERRL.  Blinks to threat bilaterally. No facial asymmetry. Tongue midline. Strength and tone are normal. Gait symmetric, steady. Kyphotic. Impression/Plan: This is a 63-year-old right-handed male who presents in follow-up for seizures. He had new onset seizure in September 2018 around the time of the head injury from a syncopal episode. He had an abnormal CT head and EEG at that time. It was noted that around that time when discharged from the hospital he came off the [address] that he was started on and he had a seizure after stopping the drug. He was last seen here 1 year ago and the Keppra was continued at that time. His friend seems to think he has been off the [address]. It was noted that he had a PCP visit recently and the [address] was continued because they believed he was about to run out of it. Would continue the Keppra at this time. Gave instructions on restarting and increasing to the previous dose. Instructed to call if he has worsening of his mood. He is currently taking Mellaril and Elavil for his mood. We will keep the [address] the same for now because he tolerated the medication and he had no further seizures on it. Continue to see psychiatry provider or PCP for behavioral issues. It appears he has blood work in the system to have done and follow-up with his PCP. Provided written instructions. Follow-up in 6 months. Diagnoses and all orders for this visit:    1. Seizures ([address]    Other orders  -     levETIRAcetam (KEPPRA) 500 mg tablet; Take one tab by mouth twice daily. After 2 weeks, increase to 2 tabs twice daily. Total time 25 minutes with 15 minutes spent in counseling. Signed By: Arie Zepeda NP        PLEASE NOTE:   Portions of this document may have been produced using voice recognition software. Unrecognized errors in transcription may be present.

## 2020-12-01 NOTE — PROGRESS NOTES
Jonathan Bonilla. is a 61 y.o. male who is in the office for a follow up. 1. Have you been to the ER, urgent care clinic since your last visit? Hospitalized since your last visit? No    2. Have you seen or consulted any other health care providers outside of the [address] [place] [address] since your last visit? Include any pap smears or colon screening.  No

## 2020-12-03 ENCOUNTER — HOSPITAL ENCOUNTER (OUTPATIENT)
Dept: LAB | Age: 60
Discharge: HOME | End: 2020-12-03
Payer: COMMERCIAL

## 2020-12-03 DIAGNOSIS — Z11.59 NEED FOR HEPATITIS C SCREENING TEST: ICD-10-CM

## 2020-12-03 DIAGNOSIS — G40.909 SEIZURE DISORDER (HCC): ICD-10-CM

## 2020-12-03 DIAGNOSIS — Z13.220 SCREENING FOR LIPID DISORDERS: ICD-10-CM

## 2020-12-03 LAB
ALBUMIN SERPL-MCNC: 3.6 G/DL (ref 3.4–5)
ALBUMIN/GLOB SERPL: 1.1 {RATIO} (ref 0.8–1.7)
ALP SERPL-CCNC: 95 U/L (ref 45–117)
ALT SERPL-CCNC: 37 U/L (ref 16–61)
ANION GAP SERPL CALC-SCNC: 4 MMOL/L (ref 3–18)
AST SERPL-CCNC: 14 U/L (ref 10–38)
BASOPHILS # BLD: 0 K/UL (ref 0–0.1)
BASOPHILS NFR BLD: 0 % (ref 0–2)
BILIRUB SERPL-MCNC: 0.4 MG/DL (ref 0.2–1)
BUN SERPL-MCNC: 12 MG/DL (ref 7–18)
BUN/CREAT SERPL: 19 (ref 12–20)
CALCIUM SERPL-MCNC: 9 MG/DL (ref 8.5–10.1)
CHLORIDE SERPL-SCNC: 106 MMOL/L (ref 100–111)
CHOLEST SERPL-MCNC: 151 MG/DL
CO2 SERPL-SCNC: 32 MMOL/L (ref 21–32)
CREAT SERPL-MCNC: 0.64 MG/DL (ref 0.6–1.3)
DIFFERENTIAL METHOD BLD: NORMAL
EOSINOPHIL # BLD: 0.3 K/UL (ref 0–0.4)
EOSINOPHIL NFR BLD: 4 % (ref 0–5)
ERYTHROCYTE [DISTWIDTH] IN BLOOD BY AUTOMATED COUNT: 11.9 % (ref 11.6–14.5)
GLOBULIN SER CALC-MCNC: 3.2 G/DL (ref 2–4)
GLUCOSE SERPL-MCNC: 94 MG/DL (ref 74–99)
HCT VFR BLD AUTO: 43 % (ref 36–48)
HCV AB SER IA-ACNC: 0.05 INDEX
HCV AB SERPL QL IA: NEGATIVE
HCV COMMENT,HCGAC: NORMAL
HDLC SERPL-MCNC: 45 MG/DL (ref 40–60)
HDLC SERPL: 3.4 {RATIO} (ref 0–5)
HGB BLD-MCNC: 14 G/DL (ref 13–16)
LDLC SERPL CALC-MCNC: 92.8 MG/DL (ref 0–100)
LIPID PROFILE,FLP: NORMAL
LYMPHOCYTES # BLD: 1.4 K/UL (ref 0.9–3.6)
LYMPHOCYTES NFR BLD: 21 % (ref 21–52)
MCH RBC QN AUTO: 29.5 PG (ref 24–34)
MCHC RBC AUTO-ENTMCNC: 32.6 G/DL (ref 31–37)
MCV RBC AUTO: 90.7 FL (ref 74–97)
MONOCYTES # BLD: 0.7 K/UL (ref 0.05–1.2)
MONOCYTES NFR BLD: 9 % (ref 3–10)
NEUTS SEG # BLD: 4.5 K/UL (ref 1.8–8)
NEUTS SEG NFR BLD: 66 % (ref 40–73)
PLATELET # BLD AUTO: 186 K/UL (ref 135–420)
PMV BLD AUTO: 10.5 FL (ref 9.2–11.8)
POTASSIUM SERPL-SCNC: 4.2 MMOL/L (ref 3.5–5.5)
PROT SERPL-MCNC: 6.8 G/DL (ref 6.4–8.2)
RBC # BLD AUTO: 4.74 M/UL (ref 4.7–5.5)
SODIUM SERPL-SCNC: 142 MMOL/L (ref 136–145)
TRIGL SERPL-MCNC: 66 MG/DL (ref ?–150)
VLDLC SERPL CALC-MCNC: 13.2 MG/DL
WBC # BLD AUTO: 6.9 K/UL (ref 4.6–13.2)

## 2020-12-03 PROCEDURE — 85025 COMPLETE CBC W/AUTO DIFF WBC: CPT

## 2020-12-03 PROCEDURE — 36415 COLL VENOUS BLD VENIPUNCTURE: CPT

## 2020-12-03 PROCEDURE — 80061 LIPID PANEL: CPT

## 2020-12-03 PROCEDURE — 80053 COMPREHEN METABOLIC PANEL: CPT

## 2020-12-03 PROCEDURE — 86803 HEPATITIS C AB TEST: CPT

## 2020-12-10 PROBLEM — Z86.711 HISTORY OF PULMONARY EMBOLUS (PE): Status: ACTIVE | Noted: 2018-09-13

## 2020-12-10 PROBLEM — Z86.711 HISTORY OF PULMONARY EMBOLUS (PE): Status: RESOLVED | Noted: 2018-09-13 | Resolved: 2020-12-10

## 2020-12-10 NOTE — PROGRESS NOTES
Temo Nguyen is a 61 y.o. male who was seen by synchronous (real-time) audio-video technology, accompanied by caregiver, Tedi Leyden on 12/17/2020 for Seizure, Leg Swelling, and Labs      Assessment & Plan:   Diagnoses and all orders for this visit:    1. Seizure disorder ([address]   Stable on regimen per neuro, continue    2. Bilateral leg edema   Recommend compression stockings during the day, remove at night   Elevate legs when sitting down, reduce salt intake  12  Follow-up and Dispositions    · Return in about 6 months (around 6/17/2021) for seizure disorder. SUBJECTIVE:     HPI    Seizure Disorder-  Last seizure in September 2018  Treatment:  Keppra 500 mg 2 tabs BID  He is now followed by neurology, last seen on 12/01/2020  Next appointment in 6 mos  No reported seizures since last office visit  Ajay Evans (brother) will be his guardian, along with Nicolas (brother's partner)    BLE Swelling-  Onset: more than 6 mos ago  Associated symptoms:  None   Left worse than the right  Denies pain or redness, no SOB    Lab results discussed today:  Lab Results   Component Value Date/Time    WBC 6.9 12/03/2020 10:56 AM    HGB 14.0 12/03/2020 10:56 AM    HCT 43.0 12/03/2020 10:56 AM    PLATELET 914 18/84/7518 10:56 AM    MCV 90.7 12/03/2020 10:56 AM     Lab Results   Component Value Date/Time    Sodium 142 12/03/2020 10:56 AM    Potassium 4.2 12/03/2020 10:56 AM    Chloride 106 12/03/2020 10:56 AM    CO2 32 12/03/2020 10:56 AM    Anion gap 4 12/03/2020 10:56 AM    Glucose 94 12/03/2020 10:56 AM    BUN 12 12/03/2020 10:56 AM    Creatinine 0.64 12/03/2020 10:56 AM    BUN/Creatinine ratio 19 12/03/2020 10:56 AM    GFR est AA >60 12/03/2020 10:56 AM    GFR est non-AA >60 12/03/2020 10:56 AM    Calcium 9.0 12/03/2020 10:56 AM    Bilirubin, total 0.4 12/03/2020 10:56 AM    Alk.  phosphatase 95 12/03/2020 10:56 AM    Protein, total 6.8 12/03/2020 10:56 AM    Albumin 3.6 12/03/2020 10:56 AM    Globulin 3.2 12/03/2020 10:56 AM A-G Ratio 1.1 12/03/2020 10:56 AM    ALT (SGPT) 37 12/03/2020 10:56 AM    AST (SGOT) 14 12/03/2020 10:56 AM     Lab Results   Component Value Date/Time    Cholesterol, total 151 12/03/2020 10:56 AM    HDL Cholesterol 45 12/03/2020 10:56 AM    LDL, calculated 92.8 12/03/2020 10:56 AM    VLDL, calculated 13.2 12/03/2020 10:56 AM    Triglyceride 66 12/03/2020 10:56 AM    CHOL/HDL Ratio 3.4 12/03/2020 10:56 AM     Lab Results   Component Value Date/Time    Hepatitis C virus Ab 0.05 12/03/2020 10:56 AM     Health Maintenance:  Colonoscopy - due now, previous referral given, advised to schedule  Tetanus vac - due now, recommended  Shingles vac - due now, recommended    Patient Active Problem List   Diagnosis Code    Eczema L30.9    Intellectual disability F68    History of pulmonary embolism Z86.711    Seizure disorder (HCC) G40.909     Current Outpatient Medications   Medication Sig Dispense Refill    levETIRAcetam (KEPPRA) 500 mg tablet Take one tab by mouth twice daily. After 2 weeks, increase to 2 tabs twice daily. 120 Tab 6    amitriptyline (ELAVIL) 100 mg tablet Take 1 Tab by mouth nightly.  30 Tab 0     No Known Allergies   Past Medical History:   Diagnosis Date    Mental retardation     Overweight(278.02)       Social History     Socioeconomic History    Marital status: SINGLE     Spouse name: Not on file    Number of children: Not on file    Years of education: Not on file    Highest education level: Not on file   Occupational History    Not on file   Social Needs    Financial resource strain: Not on file    Food insecurity     Worry: Not on file     Inability: Not on file    Transportation needs     Medical: Not on file     Non-medical: Not on file   Tobacco Use    Smoking status: Never Smoker    Smokeless tobacco: Never Used   Substance and Sexual Activity    Alcohol use: No    Drug use: No    Sexual activity: Never   Lifestyle    Physical activity     Days per week: Not on file Minutes per session: Not on file    Stress: Not on file   Relationships    Social connections     Talks on phone: Not on file     Gets together: Not on file     Attends Jain service: Not on file     Active member of club or organization: Not on file     Attends meetings of clubs or organizations: Not on file     Relationship status: Not on file    Intimate partner violence     Fear of current or ex partner: Not on file     Emotionally abused: Not on file     Physically abused: Not on file     Forced sexual activity: Not on file   Other Topics Concern    Not on file   Social History Narrative    Not on file      History reviewed. No pertinent surgical history. History reviewed. No pertinent family history. Review of Systems   Constitutional: Negative for chills, fever and malaise/fatigue. Respiratory: Negative for shortness of breath. Cardiovascular: Positive for leg swelling (BLE). Negative for chest pain. Neurological: Negative for dizziness, seizures, weakness and headaches. OBJECTIVE:     Physical Exam   Constitutional: Stable-appearing, in no distress, alert and oriented  HENT:   Head: Normocephalic and atraumatic. Ears:  Hearing grossly intact. Mouth:  No visible perioral lesions, cyanosis, or lip swelling. Pulmonary/Chest: Does not appear dyspneic, no audible wheezes or nasal flaring. Musculoskeletal: Grossly normal active ROM in upper extremities. Peripheral Neurovascular:  2(+) non-pitting edema in the BLE  Neurological:  Mentally delayed    We discussed the expected course, resolution and complications of the diagnosis(es) in detail. Medication risks, benefits, costs, interactions, and alternatives were discussed as indicated. I advised him to contact the office if his condition worsens, changes or fails to improve as anticipated. He expressed understanding with the diagnosis(es) and plan.      Sherry Robles., who was evaluated through a synchronous (real-time) audio-video encounter, and/or his healthcare decision maker, is aware that it is a billable service, with coverage as determined by his insurance carrier. provided verbal consent to proceed: Yes, and patient identification was verified. It was conducted pursuant to the emergency declaration under the [address] [place] [address], [address] [place] [address] authority and the [employer] Act. A caregiver was present when appropriate. Ability to conduct physical exam was limited. I was in the office. The patient was at home.     KARLA Gonzalez

## 2020-12-15 DIAGNOSIS — G40.909 SEIZURE DISORDER (HCC): ICD-10-CM

## 2020-12-15 NOTE — TELEPHONE ENCOUNTER
Requested Prescriptions     Pending Prescriptions Disp Refills    amitriptyline (ELAVIL) 100 mg tablet 30 Tab 0     Sig: Take 1 Tab by mouth nightly. Patient care taker called PCP office to get medication refilled they were referred to neuro to get refill.  Please advicse

## 2020-12-17 ENCOUNTER — VIRTUAL VISIT (OUTPATIENT)
Dept: FAMILY MEDICINE CLINIC | Age: 60
End: 2020-12-17
Payer: COMMERCIAL

## 2020-12-17 DIAGNOSIS — R60.0 BILATERAL LEG EDEMA: ICD-10-CM

## 2020-12-17 DIAGNOSIS — G40.909 SEIZURE DISORDER (HCC): Primary | ICD-10-CM

## 2020-12-17 PROCEDURE — 99213 OFFICE O/P EST LOW 20 MIN: CPT | Performed by: NURSE PRACTITIONER

## 2020-12-17 RX ORDER — LEVETIRACETAM 500 MG/1
TABLET ORAL
Qty: 120 TAB | Refills: 6 | Status: SHIPPED | OUTPATIENT
Start: 2020-12-17 | End: 2021-08-04 | Stop reason: SDUPTHER

## 2020-12-17 RX ORDER — AMITRIPTYLINE HYDROCHLORIDE 100 MG/1
100 TABLET, FILM COATED ORAL
Qty: 30 TAB | Refills: 0 | OUTPATIENT
Start: 2020-12-17

## 2020-12-17 NOTE — TELEPHONE ENCOUNTER
Spoke with the caregiver verified patient . I let him know what the messages stated.  No othet questions or concerns

## 2020-12-17 NOTE — PROGRESS NOTES
Vargas Small presents today for   Chief Complaint   Patient presents with    Seizure    Leg Swelling    Labs       Virtual/telephone visit    Depression Screening:  3 most recent PHQ Screens 11/4/2020   Little interest or pleasure in doing things Not at all   Feeling down, depressed, irritable, or hopeless Not at all   Total Score PHQ 2 0       Learning Assessment:  Learning Assessment 11/4/2020   PRIMARY LEARNER Patient   HIGHEST LEVEL OF EDUCATION - PRIMARY LEARNER  GRADUATED HIGH SCHOOL OR GED   BARRIERS PRIMARY LEARNER [address] CAREGIVER Yes   CO-LEARNER NAME [id number] [facility] LEVEL OF EDUCATION 2 YEARS  [address]   PRIMARY LANGUAGE ENGLISH   PRIMARY LANGUAGE CO-LEARNER ENGLISH    NEED No   LEARNER PREFERENCE PRIMARY DEMONSTRATION   LEARNER PREFERENCE CO-LEARNER DEMONSTRATION   ANSWERED BY Iker JORDAN OTHER       Travel Screening:   Travel Screening     Question   Response    In the last month, have you been in contact with someone who was confirmed or suspected to have Luretha Creed / COVID-19? No / Unsure    Have you had a COVID-19 viral test in the last 14 days? No    Do you have any of the following new or worsening symptoms? None of these    Have you traveled internationally in the last month? No      Travel History   Travel since 11/17/20     No documented travel since 11/17/20          Health Maintenance reviewed and discussed and ordered per Provider. Health Maintenance Due   Topic Date Due    DTaP/Tdap/Td series (1 - Tdap) 12/25/1981    Shingrix Vaccine Age 50> (1 of 2) 12/25/2010    Colorectal Cancer Screening Combo  12/25/2010   . Coordination of Care:  1. Have you been to the ER, urgent care clinic since your last visit? Hospitalized since your last visit? No    2. Have you seen or consulted any other health care providers outside of the [address] [place] [address] since your last visit? Include any pap smears or colon screening.  No

## 2020-12-21 DIAGNOSIS — G40.909 SEIZURE DISORDER (HCC): ICD-10-CM

## 2020-12-21 RX ORDER — AMITRIPTYLINE HYDROCHLORIDE 100 MG/1
100 TABLET, FILM COATED ORAL
Qty: 30 TAB | Refills: 0 | Status: SHIPPED | OUTPATIENT
Start: 2020-12-21 | End: 2021-11-05 | Stop reason: SDUPTHER

## 2021-01-11 DIAGNOSIS — G40.909 SEIZURE DISORDER (HCC): ICD-10-CM

## 2021-01-11 RX ORDER — AMITRIPTYLINE HYDROCHLORIDE 100 MG/1
100 TABLET, FILM COATED ORAL
Qty: 30 TAB | Refills: 0 | OUTPATIENT
Start: 2021-01-11

## 2021-01-11 NOTE — TELEPHONE ENCOUNTER
Patient need a medication refill. He has an appointment for tomorrow for mental status change.  Please advise

## 2021-01-11 NOTE — TELEPHONE ENCOUNTER
Medication needs to be obtained from neurology. Patient's caregiver has been told multiple times that his seizure medications need to come from neurology. They are managing ALL of his meds.

## 2021-01-12 ENCOUNTER — HOSPITAL ENCOUNTER (EMERGENCY)
Age: 61
Discharge: HOME | End: 2021-01-12
Attending: EMERGENCY MEDICINE
Payer: COMMERCIAL

## 2021-01-12 VITALS
TEMPERATURE: 97.7 F | DIASTOLIC BLOOD PRESSURE: 96 MMHG | HEART RATE: 86 BPM | SYSTOLIC BLOOD PRESSURE: 142 MMHG | RESPIRATION RATE: 18 BRPM | OXYGEN SATURATION: 97 %

## 2021-01-12 DIAGNOSIS — R46.89 ACTING OUT AS MENTAL DEFENSE MECHANISM: Primary | ICD-10-CM

## 2021-01-12 LAB
AMPHET UR QL SCN: NEGATIVE
ANION GAP SERPL CALC-SCNC: 1 MMOL/L (ref 3–18)
BARBITURATES UR QL SCN: NEGATIVE
BASOPHILS # BLD: 0 K/UL (ref 0–0.1)
BASOPHILS NFR BLD: 0 % (ref 0–2)
BENZODIAZ UR QL: NEGATIVE
BUN SERPL-MCNC: 11 MG/DL (ref 7–18)
BUN/CREAT SERPL: 15 (ref 12–20)
CALCIUM SERPL-MCNC: 9.2 MG/DL (ref 8.5–10.1)
CANNABINOIDS UR QL SCN: NEGATIVE
CHLORIDE SERPL-SCNC: 108 MMOL/L (ref 100–111)
CO2 SERPL-SCNC: 32 MMOL/L (ref 21–32)
COCAINE UR QL SCN: NEGATIVE
CREAT SERPL-MCNC: 0.73 MG/DL (ref 0.6–1.3)
DIFFERENTIAL METHOD BLD: ABNORMAL
EOSINOPHIL # BLD: 0.2 K/UL (ref 0–0.4)
EOSINOPHIL NFR BLD: 3 % (ref 0–5)
ERYTHROCYTE [DISTWIDTH] IN BLOOD BY AUTOMATED COUNT: 12.2 % (ref 11.6–14.5)
ETHANOL SERPL-MCNC: <3 MG/DL (ref 0–3)
GLUCOSE SERPL-MCNC: 82 MG/DL (ref 74–99)
HCT VFR BLD AUTO: 40.4 % (ref 36–48)
HDSCOM,HDSCOM: NORMAL
HGB BLD-MCNC: 13.4 G/DL (ref 13–16)
LYMPHOCYTES # BLD: 1.6 K/UL (ref 0.9–3.6)
LYMPHOCYTES NFR BLD: 22 % (ref 21–52)
MCH RBC QN AUTO: 29.6 PG (ref 24–34)
MCHC RBC AUTO-ENTMCNC: 33.2 G/DL (ref 31–37)
MCV RBC AUTO: 89.2 FL (ref 74–97)
METHADONE UR QL: NEGATIVE
MONOCYTES # BLD: 0.7 K/UL (ref 0.05–1.2)
MONOCYTES NFR BLD: 9 % (ref 3–10)
NEUTS SEG # BLD: 4.9 K/UL (ref 1.8–8)
NEUTS SEG NFR BLD: 66 % (ref 40–73)
OPIATES UR QL: NEGATIVE
PCP UR QL: NEGATIVE
PLATELET # BLD AUTO: 187 K/UL (ref 135–420)
PMV BLD AUTO: 10.3 FL (ref 9.2–11.8)
POTASSIUM SERPL-SCNC: 4.7 MMOL/L (ref 3.5–5.5)
RBC # BLD AUTO: 4.53 M/UL (ref 4.7–5.5)
SODIUM SERPL-SCNC: 141 MMOL/L (ref 136–145)
WBC # BLD AUTO: 7.4 K/UL (ref 4.6–13.2)

## 2021-01-12 PROCEDURE — 80307 DRUG TEST PRSMV CHEM ANLYZR: CPT

## 2021-01-12 PROCEDURE — 99281 EMR DPT VST MAYX REQ PHY/QHP: CPT

## 2021-01-12 PROCEDURE — 80048 BASIC METABOLIC PNL TOTAL CA: CPT

## 2021-01-12 PROCEDURE — 82077 ASSAY SPEC XCP UR&BREATH IA: CPT

## 2021-01-12 PROCEDURE — 85025 COMPLETE CBC W/AUTO DIFF WBC: CPT

## 2021-01-12 NOTE — BSMART NOTE
Comprehensive Assessment Form Part 1 Section I - Disposition The plan is for the patient to follow up with outpatient services. Discussed with the Medical Doctor, Renee VERONICA who is in agreement with disposition. Section II - Integrated Summary The information is given by the patient and guardian. The Chief Complaint is increased behaviors. Previous Hospitalizations: as a child Patient and Guardian gave verbal consent to speak with this writer via [employer]. Per patient's guardian, Yanique Ureña, patient has displayed increased self harm behaviors and verbal threats towards others. Guardian advises patient has not physically harmed anyone else. Guardian advises patient was seen by Luisana Hurst NP today who advised him to bring patient to ED. Guardian reports medication compliance with Elavil and Keppra. Patient oriented to self only. Patient unable to provide and meaningful answers to questions during assessment. Patient noted to blurt out random statements while this writer was speaking to guardian. Patient not able to participate in assessment. Guardian denies knowledge patient ID diagnosis being mild, moderate, or severe. Guardian advises he and his s/o Izabella Francois are both guardians who take of patient. Patient has brain damage and is not able to do anything for himself. Discussed with guardian voluntary/involuntary admission process. Patient appears to lack capacity to sign in voluntarily. Guardian advises he does not feel the need to petition for a TDO and feel safe taking the patient home. Patient has not displayed any aggressive behaviors while in ED. Guardian advises he was told to follow up with neurology and psychiatry. Guardian advises he plans to set up an appointment with [facility]. Discussed with guardian the importance of outpatient f/u and faxed over a list of additional providers for ED staff to give to guardian. Angelica C Rainelle Kanner

## 2021-01-12 NOTE — ED TRIAGE NOTES
Pt has hx of a brain injury, per his brother-in-law, pt is being brought in by a 48hr hold.  Has been acting out at home toward his family

## 2021-01-12 NOTE — ED PROVIDER NOTES
[employer]   [facility] EMERGENCY DEPT    Patient Name: Priyanka Lehman. History of Presenting Illness     Chief Complaint   Patient presents with   [address] Problem     61 y.o. male with a past medical history of mental retardation presents to the ED via brother-in-law for complaints of acting out today. Patient was seen by his primary care doctor today for increased agitation. Note from PCP indicates that patient currently has suicidal thoughts and thoughts to possibly hurt others. Patient denies any other associated signs or symptoms. Patient denies any other complaints. Nursing notes regarding the HPI and triage nursing notes were reviewed. Prior medical records were reviewed. Current Outpatient Medications   Medication Sig Dispense Refill    amitriptyline (ELAVIL) 100 mg tablet Take 1 Tab by mouth nightly. 30 Tab 0    levETIRAcetam (KEPPRA) 500 mg tablet Take one tab by mouth twice daily. After 2 weeks, increase to 2 tabs twice daily. 120 Tab 6       Past History     Past Medical History:  Past Medical History:   Diagnosis Date    Mental retardation     Overweight(278.02)        Past Surgical History:  No past surgical history on file. Family History:  No family history on file. Social History:  Social History     Tobacco Use    Smoking status: Never Smoker    Smokeless tobacco: Never Used   Substance Use Topics    Alcohol use: No    Drug use: No       Allergies:  No Known Allergies    Patient's primary care provider (as noted in EPIC):  Gino Roy NP    Review of Systems   Constitutional:  Denies malaise, fever, chills. Neuro:  Denies headache, LOC, dizziness, neurologic symptoms/deficits/paresthesias. Skin: Denies injury, rash, itching or skin changes. Psych: + agitation, SI, thoughts to harm others. All other systems negative as reviewed.      Visit Vitals  BP (!) 142/96 (BP 1 Location: Left arm, BP Patient Position: At rest)   Pulse 86   Temp 97.7 °F (36.5 °C)   Resp 18   SpO2 97%       PHYSICAL EXAM:    CONSTITUTIONAL:  Alert, in no apparent distress;  well developed;  well nourished. HEAD:  Normocephalic, atraumatic. EYES:  EOMI. Non-icteric sclera. Normal conjunctiva. ENTM:  Mouth: mucous membranes moist.  NECK:  Supple  RESPIRATORY:  Chest clear, equal breath sounds, good air movement. Without wheezes, rhonchi or rales. CARDIOVASCULAR:  Regular rate and rhythm. No murmurs, rubs, or gallops. GI:  Normal bowel sounds, abdomen soft and non-tender. No rebound or guarding. BACK:  Non-tender. UPPER EXT:  Normal inspection. LOWER EXT:  No edema, no calf tenderness. Distal pulses intact. NEURO:  Moves all four extremities, and grossly normal motor exam.  SKIN:  No rashes;  Normal for age. PSYCH:  Alert and normal affect. DIFFERENTIAL DIAGNOSES/ MEDICAL DECISION MAKING:   Differential diagnoses/impression: Need to rule out obvious organic causes versus psychological etiology. Recent Results (from the past 12 hour(s))   DRUG SCREEN, URINE    Collection Time: 01/12/21  1:53 PM   Result Value Ref Range    BENZODIAZEPINES Negative NEG      BARBITURATES Negative NEG      THC (TH-CANNABINOL) Negative NEG      OPIATES Negative NEG      PCP(PHENCYCLIDINE) Negative NEG      COCAINE Negative NEG      AMPHETAMINES Negative NEG      METHADONE Negative NEG      HDSCOM (NOTE)    CBC WITH AUTOMATED DIFF    Collection Time: 01/12/21  1:56 PM   Result Value Ref Range    WBC 7.4 4.6 - 13.2 K/uL    RBC 4.53 (L) 4.70 - 5.50 M/uL    HGB 13.4 13.0 - 16.0 g/dL    HCT 40.4 36.0 - 48.0 %    MCV 89.2 74.0 - 97.0 FL    MCH 29.6 24.0 - 34.0 PG    MCHC 33.2 31.0 - 37.0 g/dL    RDW 12.2 11.6 - 14.5 %    PLATELET 134 785 - 684 K/uL    MPV 10.3 9.2 - 11.8 FL    NEUTROPHILS 66 40 - 73 %    LYMPHOCYTES 22 21 - 52 %    MONOCYTES 9 3 - 10 %    EOSINOPHILS 3 0 - 5 %    BASOPHILS 0 0 - 2 %    ABS. NEUTROPHILS 4.9 1.8 - 8.0 K/UL    ABS. LYMPHOCYTES 1.6 0.9 - 3.6 K/UL    ABS. MONOCYTES 0.7 0.05 - 1.2 K/UL    ABS. EOSINOPHILS 0.2 0.0 - 0.4 K/UL    ABS. BASOPHILS 0.0 0.0 - 0.1 K/UL    DF AUTOMATED     METABOLIC PANEL, BASIC    Collection Time: 01/12/21  1:56 PM   Result Value Ref Range    Sodium 141 136 - 145 mmol/L    Potassium 4.7 3.5 - 5.5 mmol/L    Chloride 108 100 - 111 mmol/L    CO2 32 21 - 32 mmol/L    Anion gap 1 (L) 3.0 - 18 mmol/L    Glucose 82 74 - 99 mg/dL    BUN 11 7.0 - 18 MG/DL    Creatinine 0.73 0.6 - 1.3 MG/DL    BUN/Creatinine ratio 15 12 - 20      GFR est AA >60 >60 ml/min/1.73m2    GFR est non-AA >60 >60 ml/min/1.73m2    Calcium 9.2 8.5 - 10.1 MG/DL   ETHYL ALCOHOL    Collection Time: 01/12/21  1:56 PM   Result Value Ref Range    ALCOHOL(ETHYL),SERUM <3 0 - 3 MG/DL      ED COURSE:      2:15PM Consult with Susan from crisis, she will evaluate the patient. Susan informed me that the caretaker will pay close attention to the patient and keep him safe. States that they do not want to go forward with a TDO at this time. Susan states that patient is safe for discharge home, may make return for any acute worsening. She states she will fax over a list of psychiatrists for him. Diagnosis:   1. Acting out as mental defense mechanism      Disposition: Discharge    Follow-up Information     Follow up With Specialties Details Why [address] Psychiatry In 3 days  [address]  [address] [facility] EMERGENCY DEPT Emergency Medicine  If symptoms worsen [address] [place] [address] [id number] [phone]          Patient's Medications   Start Taking    No medications on file   Continue Taking    AMITRIPTYLINE (ELAVIL) 100 MG TABLET    Take 1 Tab by mouth nightly. LEVETIRACETAM (KEPPRA) 500 MG TABLET    Take one tab by mouth twice daily. After 2 weeks, increase to 2 tabs twice daily.    These Medications have changed    No medications on file   Stop Taking    No medications on file     JEREMÍAS Borjas

## 2021-02-01 NOTE — TELEPHONE ENCOUNTER
Patient's brother called I let him know what the above message stated. He stated the patient is taking the keppa and would like to be seen sooner. I scheduled for a VV.

## 2021-08-04 ENCOUNTER — OFFICE VISIT (OUTPATIENT)
Dept: NEUROLOGY | Age: 61
End: 2021-08-04
Payer: COMMERCIAL

## 2021-08-04 VITALS
HEART RATE: 72 BPM | WEIGHT: 192.6 LBS | OXYGEN SATURATION: 97 % | BODY MASS INDEX: 27.64 KG/M2 | DIASTOLIC BLOOD PRESSURE: 80 MMHG | RESPIRATION RATE: 16 BRPM | SYSTOLIC BLOOD PRESSURE: 128 MMHG

## 2021-08-04 DIAGNOSIS — G40.909 SEIZURE DISORDER (HCC): Primary | ICD-10-CM

## 2021-08-04 PROCEDURE — 99213 OFFICE O/P EST LOW 20 MIN: CPT | Performed by: NURSE PRACTITIONER

## 2021-08-04 RX ORDER — LEVETIRACETAM 1000 MG/1
1000 TABLET ORAL 2 TIMES DAILY
Qty: 60 TABLET | Refills: 6 | Status: SHIPPED | OUTPATIENT
Start: 2021-08-04

## 2021-08-04 RX ORDER — ASCORBIC ACID 500 MG
TABLET ORAL
COMMUNITY

## 2021-08-04 RX ORDER — BISMUTH SUBSALICYLATE 262 MG
1 TABLET,CHEWABLE ORAL DAILY
COMMUNITY

## 2021-08-04 NOTE — PROGRESS NOTES
[facility]  [address] [facility], [address], [place], [address]  [address] V[place], [address]  Office:  [phone]  Fax: [phone]  Chief Complaint   Patient presents with    Follow-up       HPI: Britni Rodriguez. presents in follow-up for seizures. He was initially seen here in November 2018 by Dr. Mateo Lewis for a single seizure which occurred in September 2018. He had a spontaneous PE in September 2018. This was associated with a syncopal event. During the syncopal event he was noted to have a brief tonic-clonic seizure. According to his mother he never had a seizure before that episode. He was discharged from the hospital and she was not sure why he was on the medication so it was stopped and then he had a seizure a week after the discontinuation of the drug. Since then he was placed back on the "[employer] and has not had any convulsive activity since. No side effects on the medication. The CT of the head from 9/1/2018 noted scalp laceration with staples in the left frontal area and a suspect small area of parenchymal hemorrhagic contusion at the left frontal lobe. EEG from 12/11/2018 reported relatively frequent left temporal sharp transients raising concern for left temporal epileptiform focus. At the visits in January, April, and October 2019, his assessment and plan was new onset seizures with abnormal EEG and considering the seizure, the abnormal CT from trauma, and abnormal EEG he felt compelled to keep him on his medication for now. He was last seen here on 12/1/2020. At that time his friend who is his caregiver was not sure if Mr. Jose Armando Leblanc was on the [employer] [address] any longer. It was noted that it was ordered by his PCP in November and the note stated to follow-up here for refills. His friend reported that he was on Mellaril and Elavil from his psychiatry provider. Keppra was continued at that time. He tolerates the medication.   He has had cognitive troubles since birth. He presents today in follow-up. He is with his friend Tristan Hernandez. He is back on Keppra. He is taking 1000 mg twice daily. He lives with Tristan Hernandez and his . They are his caregivers. They help him with medications. That is his only medication besides vitamins. He is not sleeping so Tristan Hernandez reports his psychiatrist is going to place him on Seroquel and Wellbutrin. There have been no recent seizures. He is tolerating the Keppra. No mood changes on it. Past Medical History:   Diagnosis Date    Mental retardation     Overweight(278.02)        No past surgical history on file. Current Outpatient Medications   Medication Sig Dispense Refill    multivitamin (ONE A DAY) tablet Take 1 Tablet by mouth daily.  ascorbic acid, vitamin C, (Vitamin C) 500 mg tablet Take  by mouth.  cholecalciferol, vitamin D3, (VITAMIN D3 PO) Take  by mouth.  omega-3 fatty acids (FISH OIL CONCENTRATE PO) Take  by mouth.  levETIRAcetam 1,000 mg tablet Take 1 Tablet by mouth two (2) times a day. 60 Tablet 6    amitriptyline (ELAVIL) 100 mg tablet Take 1 Tab by mouth nightly. 30 Tab 0        No Known Allergies    Social History     Tobacco Use    Smoking status: Never Smoker    Smokeless tobacco: Never Used   Substance Use Topics    Alcohol use: No    Drug use: No       History reviewed. No pertinent family history. Review of Systems:  GENERAL: Denies fever or fatigue  CARDIAC: No CP or SOB  PULMONARY: No cough or SOB  MUSCULOSKELETAL: No new joint pain  NEURO: SEE HPI    Physical Examination:  Visit Vitals  /80   Pulse 72   Resp 16   Wt 87.4 kg (192 lb 9.6 oz)   SpO2 97%   BMI 27.64 kg/m²       Alert, in NAD. Heart is regular. Oriented x3. Animated, frequently talking about different topics. Speech is fast and somewhat different to comprehend. He is at baseline. Speech clear. EOMs are full, PERRL, no nystagmus. Vision intact to threat bilaterally. No facial asymmetry. Tongue is midline. Strength and tone are normal. No drift of the bilateral upper extremities. Fine finger movements symmetrical. FNF intact bilaterally. DTRs +2, gait symmetric and steady. Kyphotic. Impression/Plan: This is a 55-year-old right-handed male who presents in follow-up for seizures. He had new onset seizure in September 2018 around the time of the head injury from a syncopal episode. He had an abnormal CT head and EEG at that time. It was noted that around that time when discharged from the hospital he came off the [employer] that he was started on and he had a seizure after stopping the drug. He continues on a regimen of Keppra 1000 mg twice daily. He has been seizure-free. He is tolerating the medication. We will continue this at this time. He has continued on it because of the seizure, abnormal CT of the head at the time and abnormal EEG. We can plan on considering weaning in the future. He is tolerating the medication well. He follows up with his psychiatry provider. Follow up in 6 months. Diagnoses and all orders for this visit:    1. Seizure disorder (HCC)  -     levETIRAcetam 1,000 mg tablet; Take 1 Tablet by mouth two (2) times a day. Total time 16 minutes with 10 minutes spent in counseling. Signed By: Jailene Jones NP        PLEASE NOTE:   Portions of this document may have been produced using voice recognition software. Unrecognized errors in transcription may be present.

## 2021-08-04 NOTE — PROGRESS NOTES
Jaminyvonne Codyherb. is a 61 y.o. male who is in the office as a follow up      1. Have you been to the ER, urgent care clinic since your last visit? Hospitalized since your last visit? No    2. Have you seen or consulted any other health care providers outside of the [address] [place] [address] since your last visit? Include any pap smears or colon screening.  No

## 2021-11-05 ENCOUNTER — TELEPHONE (OUTPATIENT)
Dept: FAMILY MEDICINE CLINIC | Age: 61
End: 2021-11-05

## 2021-11-05 DIAGNOSIS — G40.909 SEIZURE DISORDER (HCC): ICD-10-CM

## 2021-11-05 RX ORDER — AMITRIPTYLINE HYDROCHLORIDE 100 MG/1
100 TABLET, FILM COATED ORAL
Qty: 30 TABLET | Refills: 0 | Status: SHIPPED | OUTPATIENT
Start: 2021-11-05

## 2021-11-05 NOTE — TELEPHONE ENCOUNTER
30-day supply given, this should be filled by psychiatry. Please have patient follow-up in 4 weeks in the office. Thank you.

## 2022-01-09 ENCOUNTER — HOSPITAL ENCOUNTER (EMERGENCY)
Age: 62
Discharge: HOME | End: 2022-01-10
Attending: STUDENT IN AN ORGANIZED HEALTH CARE EDUCATION/TRAINING PROGRAM
Payer: COMMERCIAL

## 2022-01-09 DIAGNOSIS — T50.901A ACCIDENTAL OVERDOSE, INITIAL ENCOUNTER: Primary | ICD-10-CM

## 2022-01-09 PROCEDURE — 99285 EMERGENCY DEPT VISIT HI MDM: CPT

## 2022-01-09 PROCEDURE — 93005 ELECTROCARDIOGRAM TRACING: CPT

## 2022-01-10 VITALS
HEIGHT: 70 IN | OXYGEN SATURATION: 96 % | WEIGHT: 192 LBS | TEMPERATURE: 99.4 F | DIASTOLIC BLOOD PRESSURE: 71 MMHG | HEART RATE: 99 BPM | BODY MASS INDEX: 27.49 KG/M2 | SYSTOLIC BLOOD PRESSURE: 124 MMHG | RESPIRATION RATE: 19 BRPM

## 2022-01-10 LAB
ATRIAL RATE: 118 BPM
CALCULATED P AXIS, ECG09: 50 DEGREES
CALCULATED R AXIS, ECG10: 23 DEGREES
CALCULATED T AXIS, ECG11: 52 DEGREES
DIAGNOSIS, 93000: NORMAL
P-R INTERVAL, ECG05: 150 MS
Q-T INTERVAL, ECG07: 324 MS
QRS DURATION, ECG06: 94 MS
QTC CALCULATION (BEZET), ECG08: 454 MS
VENTRICULAR RATE, ECG03: 118 BPM

## 2022-01-10 NOTE — ED TRIAGE NOTES
Patient accidentally took 6- 400 mg of Seroquel ER around 1900. Patient's caretaker states that he normally takes 1 at night. Patient's caretaker called Poison Control and was told to bring him here. Spoke with Adam Piedra at  [facility] who states that they are concerned for anticholinergic effects. Recommends getting a BMP and a mag level, if QTC > 500, make sure MAG, Calcium and Potassium are WNL. Minimum 6-8 hours of observation.

## 2022-01-10 NOTE — ED PROVIDER NOTES
HPI   Patient 80-year-old male who presents after an unintentional overdose of Seroquel. History is mainly obtained from caregiver as patient is developmentally delayed and overall poor historian. Caregiver states that he was getting out all the different medications for all of the different patients. He had 6 tablets of Seroquel 400 mg out of the table. The patient came along and thought that these were all of his medications so he took them. This happened at 7 PM.  The caregiver then immediately called poison control who recommended that he come to the emergency department. Patient is otherwise been acting like his normal self. He is not complaining of anything. He denies any sweats, chills, fevers, pain or discomfort. He states that he feels a little tired but otherwise has no complaints. He also collaborates that he accidentally took these medications and had no intentions of harming himself. Past Medical History:   Diagnosis Date    Mental retardation     Overweight(278.02)        No past surgical history on file. No family history on file. Social History     Socioeconomic History    Marital status: SINGLE     Spouse name: Not on file    Number of children: Not on file    Years of education: Not on file    Highest education level: Not on file   Occupational History    Not on file   Tobacco Use    Smoking status: Never Smoker    Smokeless tobacco: Never Used   Substance and Sexual Activity    Alcohol use: No    Drug use: No    Sexual activity: Never   Other Topics Concern    Not on file   Social History Narrative    Not on file     Social Determinants of Health     Financial Resource Strain:     Difficulty of Paying Living Expenses: Not on file   Food Insecurity:     Worried About Running Out of Food in the Last Year: Not on file    Miguel of Food in the Last Year: Not on file   Transportation Needs:     Lack of Transportation (Medical):  Not on file    Lack of Transportation (Non-Medical): Not on file   Physical Activity:     Days of Exercise per Week: Not on file    Minutes of Exercise per Session: Not on file   Stress:     Feeling of Stress : Not on file   Social Connections:     Frequency of Communication with Friends and Family: Not on file    Frequency of Social Gatherings with Friends and Family: Not on file    Attends Synagogue Services: Not on file    Active Member of [address] [place] [address] or Organizations: Not on file    Attends Club or Organization Meetings: Not on file    Marital Status: Not on file   Intimate Partner Violence:     Fear of Current or Ex-Partner: Not on file    Emotionally Abused: Not on file    Physically Abused: Not on file    Sexually Abused: Not on file   Housing Stability:     Unable to Pay for Housing in the Last Year: Not on file    Number of [place] in the Last Year: Not on file    Unstable Housing in the Last Year: Not on file         ALLERGIES: Patient has no known allergies.     Review of Systems  Constitutional: No fever  HENT: No ear pain  Eyes: No change in vision  Respiratory: No SOB  Cardio: No chest pain  GI: No blood in stool  : No hematuria  MSK: No back pain  Skin: No rashes  Neuro: No headache    Vitals:    01/09/22 2304 01/09/22 2314 01/09/22 2354 01/10/22 0139   BP:       Pulse: 98 96 100 99   Resp: 20 23 22 19   Temp:       SpO2: 91% 95% 96% 96%   Weight:       Height:                Physical Exam   General: No acute distress  Head: Normocephalic, atraumatic  Psych: Cooperative and alert  Eyes: No scleral icterus, normal conjunctiva, pupils 3 mm equal bilaterally  ENT: Moist oral mucosa  Neck: Supple  CV: Borderline tachycardic, palpable radial pulses bilaterally  Pulm: Clear breath sounds bilaterally without any wheezing or rhonchi, normal respiratory rate  GI: Normal bowel sounds, soft, non-tender  MSK: Moves all four extremities  Skin: No rashes  Neuro: Alert and conversive    [facility]    Patient 61-year-old male who presents after an unintentional ingestion of 6 tablets of Seroquel 400. He is on Seroquel 400 nightly though this was 6 times the dose. He is currently asymptomatic, with the exception of he is tachycardic and a little sleepy however it is well past his bedtime. EKG was obtained which shows sinus tachycardia rate of 101 bpm.  QRS is narrow, Axis normal, R wave progression as pericardium is satisfactory. QTC is within normal limits. Overall shows no signs of ACS or arrhythmia. No intervals are prolonged. We did speak with poison control about the patient. Since there is no concern for intentional ingestion or coingestions and they stated we can hold off on labs for now however recommended a 6-hour observation. To monitor for any signs of anticholinergic toxicity and improvement of his tachycardia. Patient is monitored emergency room for 6 hours. After 6 hours he is still acting like his normal self is not showing any abnormal findings. His tachycardia has resolved. Therefore we feel he is stable for discharge. Patient stable for discharge at this time. Patient is in agreement with the plan to be discharged at this time. All the patient's questions were answered. Patient was given written instructions on the diagnosis, and states understanding of the plan moving forward. We did discuss important signs and symptoms that should prompt quick return to the emergency department. Disposition: Patient was discharged home in stable condition.   They will follow up with their primary care physician    Prescriptions: None    Diagnosis: Unintentional ingestion of Seroquel    Procedures

## 2022-03-18 PROBLEM — G40.909 SEIZURE DISORDER (HCC): Status: ACTIVE | Noted: 2020-11-03

## 2022-03-20 PROBLEM — Z86.711 HISTORY OF PULMONARY EMBOLISM: Status: ACTIVE | Noted: 2019-07-18

## 2023-06-27 ENCOUNTER — HOSPITAL ENCOUNTER (INPATIENT)
Facility: HOSPITAL | Age: 63
LOS: 4 days | Discharge: HOME | DRG: 357 | End: 2023-07-01
Attending: EMERGENCY MEDICINE | Admitting: INTERNAL MEDICINE
Payer: MEDICARE

## 2023-06-27 ENCOUNTER — APPOINTMENT (OUTPATIENT)
Facility: HOSPITAL | Age: 63
DRG: 357 | End: 2023-06-27
Payer: MEDICARE

## 2023-06-27 DIAGNOSIS — N13.30 HYDRONEPHROSIS, UNSPECIFIED HYDRONEPHROSIS TYPE: ICD-10-CM

## 2023-06-27 DIAGNOSIS — R19.00 PELVIC MASS: Primary | ICD-10-CM

## 2023-06-27 DIAGNOSIS — R11.0 NAUSEA: ICD-10-CM

## 2023-06-27 LAB
ALBUMIN SERPL-MCNC: 3.6 G/DL (ref 3.4–5)
ALBUMIN/GLOB SERPL: 1.2 (ref 0.8–1.7)
ALP SERPL-CCNC: 87 U/L (ref 45–117)
ALT SERPL-CCNC: 30 U/L (ref 16–61)
ANION GAP SERPL CALC-SCNC: 5 MMOL/L (ref 3–18)
APPEARANCE UR: NORMAL
AST SERPL-CCNC: 12 U/L (ref 10–38)
BASOPHILS # BLD: 0.1 K/UL (ref 0–0.1)
BASOPHILS NFR BLD: 1 % (ref 0–2)
BILIRUB SERPL-MCNC: 1.1 MG/DL (ref 0.2–1)
BILIRUB UR QL: NEGATIVE
BUN SERPL-MCNC: 17 MG/DL (ref 7–18)
BUN/CREAT SERPL: 15 (ref 12–20)
CALCIUM SERPL-MCNC: 9.6 MG/DL (ref 8.5–10.1)
CHLORIDE SERPL-SCNC: 101 MMOL/L (ref 100–111)
CO2 SERPL-SCNC: 32 MMOL/L (ref 21–32)
COLOR UR: YELLOW
CREAT SERPL-MCNC: 1.1 MG/DL (ref 0.6–1.3)
DIFFERENTIAL METHOD BLD: ABNORMAL
EOSINOPHIL # BLD: 0 K/UL (ref 0–0.4)
EOSINOPHIL NFR BLD: 0 % (ref 0–5)
ERYTHROCYTE [DISTWIDTH] IN BLOOD BY AUTOMATED COUNT: 12.1 % (ref 11.6–14.5)
GLOBULIN SER CALC-MCNC: 3.1 G/DL (ref 2–4)
GLUCOSE SERPL-MCNC: 124 MG/DL (ref 74–99)
GLUCOSE UR STRIP.AUTO-MCNC: NEGATIVE MG/DL
HCT VFR BLD AUTO: 41.4 % (ref 36–48)
HGB BLD-MCNC: 13.9 G/DL (ref 13–16)
HGB UR QL STRIP: NEGATIVE
IMM GRANULOCYTES # BLD AUTO: 0 K/UL (ref 0–0.04)
IMM GRANULOCYTES NFR BLD AUTO: 0 % (ref 0–0.5)
KETONES UR QL STRIP.AUTO: NEGATIVE MG/DL
LACTATE SERPL-SCNC: 0.8 MMOL/L (ref 0.4–2)
LACTATE SERPL-SCNC: 1 MMOL/L (ref 0.4–2)
LEUKOCYTE ESTERASE UR QL STRIP.AUTO: NEGATIVE
LIPASE SERPL-CCNC: 61 U/L (ref 73–393)
LYMPHOCYTES # BLD: 0.6 K/UL (ref 0.9–3.6)
LYMPHOCYTES NFR BLD: 4 % (ref 21–52)
MAGNESIUM SERPL-MCNC: 2.2 MG/DL (ref 1.6–2.6)
MCH RBC QN AUTO: 30.7 PG (ref 24–34)
MCHC RBC AUTO-ENTMCNC: 33.6 G/DL (ref 31–37)
MCV RBC AUTO: 91.4 FL (ref 78–100)
MONOCYTES # BLD: 1.2 K/UL (ref 0.05–1.2)
MONOCYTES NFR BLD: 8 % (ref 3–10)
NEUTS SEG # BLD: 12.8 K/UL (ref 1.8–8)
NEUTS SEG NFR BLD: 87 % (ref 40–73)
NITRITE UR QL STRIP.AUTO: NEGATIVE
NRBC # BLD: 0 K/UL (ref 0–0.01)
NRBC BLD-RTO: 0 PER 100 WBC
PH UR STRIP: 7.5 (ref 5–8)
PLATELET # BLD AUTO: 154 K/UL (ref 135–420)
PLATELET COMMENT: ABNORMAL
PMV BLD AUTO: 10.6 FL (ref 9.2–11.8)
POTASSIUM SERPL-SCNC: 3.8 MMOL/L (ref 3.5–5.5)
PROT SERPL-MCNC: 6.7 G/DL (ref 6.4–8.2)
PROT UR STRIP-MCNC: NEGATIVE MG/DL
RBC # BLD AUTO: 4.53 M/UL (ref 4.35–5.65)
RBC MORPH BLD: ABNORMAL
SODIUM SERPL-SCNC: 138 MMOL/L (ref 136–145)
SP GR UR REFRACTOMETRY: 1.02 (ref 1–1.03)
TROPONIN I SERPL HS-MCNC: 6 NG/L (ref 0–78)
TROPONIN I SERPL HS-MCNC: 7 NG/L (ref 0–78)
UROBILINOGEN UR QL STRIP.AUTO: 1 EU/DL (ref 0.2–1)
WBC # BLD AUTO: 14.7 K/UL (ref 4.6–13.2)

## 2023-06-27 PROCEDURE — 2580000003 HC RX 258: Performed by: INTERNAL MEDICINE

## 2023-06-27 PROCEDURE — 1100000000 HC RM PRIVATE

## 2023-06-27 PROCEDURE — 94761 N-INVAS EAR/PLS OXIMETRY MLT: CPT

## 2023-06-27 PROCEDURE — 83690 ASSAY OF LIPASE: CPT

## 2023-06-27 PROCEDURE — 81003 URINALYSIS AUTO W/O SCOPE: CPT

## 2023-06-27 PROCEDURE — 80053 COMPREHEN METABOLIC PANEL: CPT

## 2023-06-27 PROCEDURE — 93005 ELECTROCARDIOGRAM TRACING: CPT | Performed by: EMERGENCY MEDICINE

## 2023-06-27 PROCEDURE — 74177 CT ABD & PELVIS W/CONTRAST: CPT

## 2023-06-27 PROCEDURE — 71045 X-RAY EXAM CHEST 1 VIEW: CPT

## 2023-06-27 PROCEDURE — 83735 ASSAY OF MAGNESIUM: CPT

## 2023-06-27 PROCEDURE — 83605 ASSAY OF LACTIC ACID: CPT

## 2023-06-27 PROCEDURE — 2580000003 HC RX 258: Performed by: EMERGENCY MEDICINE

## 2023-06-27 PROCEDURE — 36415 COLL VENOUS BLD VENIPUNCTURE: CPT

## 2023-06-27 PROCEDURE — 6360000004 HC RX CONTRAST MEDICATION: Performed by: EMERGENCY MEDICINE

## 2023-06-27 PROCEDURE — 84484 ASSAY OF TROPONIN QUANT: CPT

## 2023-06-27 PROCEDURE — 6360000002 HC RX W HCPCS: Performed by: INTERNAL MEDICINE

## 2023-06-27 PROCEDURE — 85025 COMPLETE CBC W/AUTO DIFF WBC: CPT

## 2023-06-27 PROCEDURE — 99223 1ST HOSP IP/OBS HIGH 75: CPT | Performed by: INTERNAL MEDICINE

## 2023-06-27 PROCEDURE — 87040 BLOOD CULTURE FOR BACTERIA: CPT

## 2023-06-27 PROCEDURE — 99285 EMERGENCY DEPT VISIT HI MDM: CPT

## 2023-06-27 RX ORDER — ACETAMINOPHEN 650 MG/1
650 SUPPOSITORY RECTAL EVERY 6 HOURS PRN
Status: DISCONTINUED | OUTPATIENT
Start: 2023-06-27 | End: 2023-07-01 | Stop reason: HOSPADM

## 2023-06-27 RX ORDER — SODIUM CHLORIDE 0.9 % (FLUSH) 0.9 %
5-40 SYRINGE (ML) INJECTION PRN
Status: DISCONTINUED | OUTPATIENT
Start: 2023-06-27 | End: 2023-07-01 | Stop reason: HOSPADM

## 2023-06-27 RX ORDER — POLYETHYLENE GLYCOL 3350 17 G/17G
17 POWDER, FOR SOLUTION ORAL DAILY PRN
Status: DISCONTINUED | OUTPATIENT
Start: 2023-06-27 | End: 2023-07-01 | Stop reason: HOSPADM

## 2023-06-27 RX ORDER — SODIUM CHLORIDE, SODIUM LACTATE, POTASSIUM CHLORIDE, AND CALCIUM CHLORIDE .6; .31; .03; .02 G/100ML; G/100ML; G/100ML; G/100ML
1000 INJECTION, SOLUTION INTRAVENOUS ONCE
Status: COMPLETED | OUTPATIENT
Start: 2023-06-27 | End: 2023-06-27

## 2023-06-27 RX ORDER — SODIUM CHLORIDE 9 MG/ML
INJECTION, SOLUTION INTRAVENOUS PRN
Status: DISCONTINUED | OUTPATIENT
Start: 2023-06-27 | End: 2023-07-01 | Stop reason: HOSPADM

## 2023-06-27 RX ORDER — SODIUM CHLORIDE, SODIUM LACTATE, POTASSIUM CHLORIDE, CALCIUM CHLORIDE 600; 310; 30; 20 MG/100ML; MG/100ML; MG/100ML; MG/100ML
INJECTION, SOLUTION INTRAVENOUS CONTINUOUS
Status: DISCONTINUED | OUTPATIENT
Start: 2023-06-27 | End: 2023-07-01 | Stop reason: HOSPADM

## 2023-06-27 RX ORDER — ONDANSETRON 4 MG/1
4 TABLET, ORALLY DISINTEGRATING ORAL EVERY 8 HOURS PRN
Status: DISCONTINUED | OUTPATIENT
Start: 2023-06-27 | End: 2023-07-01 | Stop reason: HOSPADM

## 2023-06-27 RX ORDER — ONDANSETRON 2 MG/ML
4 INJECTION INTRAMUSCULAR; INTRAVENOUS EVERY 6 HOURS PRN
Status: DISCONTINUED | OUTPATIENT
Start: 2023-06-27 | End: 2023-07-01 | Stop reason: HOSPADM

## 2023-06-27 RX ORDER — QUETIAPINE FUMARATE 200 MG/1
200 TABLET, FILM COATED ORAL NIGHTLY
Status: DISCONTINUED | OUTPATIENT
Start: 2023-06-28 | End: 2023-07-01 | Stop reason: HOSPADM

## 2023-06-27 RX ORDER — SODIUM CHLORIDE 0.9 % (FLUSH) 0.9 %
5-40 SYRINGE (ML) INJECTION EVERY 12 HOURS SCHEDULED
Status: DISCONTINUED | OUTPATIENT
Start: 2023-06-27 | End: 2023-07-01 | Stop reason: HOSPADM

## 2023-06-27 RX ORDER — ACETAMINOPHEN 325 MG/1
650 TABLET ORAL EVERY 6 HOURS PRN
Status: DISCONTINUED | OUTPATIENT
Start: 2023-06-27 | End: 2023-07-01 | Stop reason: HOSPADM

## 2023-06-27 RX ADMIN — SODIUM CHLORIDE, SODIUM LACTATE, POTASSIUM CHLORIDE, AND CALCIUM CHLORIDE 1000 ML: 600; 310; 30; 20 INJECTION, SOLUTION INTRAVENOUS at 13:00

## 2023-06-27 RX ADMIN — SODIUM CHLORIDE, SODIUM LACTATE, POTASSIUM CHLORIDE, AND CALCIUM CHLORIDE: 600; 310; 30; 20 INJECTION, SOLUTION INTRAVENOUS at 13:30

## 2023-06-27 RX ADMIN — SODIUM CHLORIDE, PRESERVATIVE FREE 10 ML: 5 INJECTION INTRAVENOUS at 23:13

## 2023-06-27 RX ADMIN — CEFTRIAXONE 1000 MG: 1 INJECTION, POWDER, FOR SOLUTION INTRAMUSCULAR; INTRAVENOUS at 21:51

## 2023-06-27 RX ADMIN — IOPAMIDOL 90 ML: 612 INJECTION, SOLUTION INTRAVENOUS at 15:41

## 2023-06-27 ASSESSMENT — PAIN DESCRIPTION - LOCATION: LOCATION: ABDOMEN

## 2023-06-27 ASSESSMENT — ENCOUNTER SYMPTOMS
EYES NEGATIVE: 1
NAUSEA: 1
ABDOMINAL PAIN: 1
CHEST TIGHTNESS: 0

## 2023-06-27 ASSESSMENT — PAIN SCALES - GENERAL
PAINLEVEL_OUTOF10: 0
PAINLEVEL_OUTOF10: 5

## 2023-06-28 LAB
ANION GAP SERPL CALC-SCNC: 3 MMOL/L (ref 3–18)
BASOPHILS # BLD: 0 K/UL (ref 0–0.1)
BASOPHILS NFR BLD: 0 % (ref 0–2)
BUN SERPL-MCNC: 13 MG/DL (ref 7–18)
BUN/CREAT SERPL: 18 (ref 12–20)
CALCIUM SERPL-MCNC: 9.3 MG/DL (ref 8.5–10.1)
CHLORIDE SERPL-SCNC: 105 MMOL/L (ref 100–111)
CO2 SERPL-SCNC: 30 MMOL/L (ref 21–32)
CREAT SERPL-MCNC: 0.72 MG/DL (ref 0.6–1.3)
DIFFERENTIAL METHOD BLD: ABNORMAL
EKG ATRIAL RATE: 76 BPM
EKG DIAGNOSIS: NORMAL
EKG P AXIS: 51 DEGREES
EKG P-R INTERVAL: 154 MS
EKG Q-T INTERVAL: 372 MS
EKG QRS DURATION: 98 MS
EKG QTC CALCULATION (BAZETT): 418 MS
EKG R AXIS: 25 DEGREES
EKG T AXIS: 64 DEGREES
EKG VENTRICULAR RATE: 76 BPM
EOSINOPHIL # BLD: 0.1 K/UL (ref 0–0.4)
EOSINOPHIL NFR BLD: 1 % (ref 0–5)
ERYTHROCYTE [DISTWIDTH] IN BLOOD BY AUTOMATED COUNT: 11.9 % (ref 11.6–14.5)
GLUCOSE SERPL-MCNC: 100 MG/DL (ref 74–99)
HCT VFR BLD AUTO: 40.5 % (ref 36–48)
HGB BLD-MCNC: 13.3 G/DL (ref 13–16)
IMM GRANULOCYTES # BLD AUTO: 0 K/UL (ref 0–0.04)
IMM GRANULOCYTES NFR BLD AUTO: 0 % (ref 0–0.5)
INR PPP: 1.2 (ref 0.8–1.2)
LYMPHOCYTES # BLD: 1.4 K/UL (ref 0.9–3.6)
LYMPHOCYTES NFR BLD: 14 % (ref 21–52)
MCH RBC QN AUTO: 30 PG (ref 24–34)
MCHC RBC AUTO-ENTMCNC: 32.8 G/DL (ref 31–37)
MCV RBC AUTO: 91.2 FL (ref 78–100)
MONOCYTES # BLD: 1.3 K/UL (ref 0.05–1.2)
MONOCYTES NFR BLD: 13 % (ref 3–10)
NEUTS SEG # BLD: 6.9 K/UL (ref 1.8–8)
NEUTS SEG NFR BLD: 71 % (ref 40–73)
NRBC # BLD: 0 K/UL (ref 0–0.01)
NRBC BLD-RTO: 0 PER 100 WBC
PLATELET # BLD AUTO: 133 K/UL (ref 135–420)
PMV BLD AUTO: 10.7 FL (ref 9.2–11.8)
POTASSIUM SERPL-SCNC: 3.6 MMOL/L (ref 3.5–5.5)
PROTHROMBIN TIME: 15.2 SEC (ref 11.5–15.2)
RBC # BLD AUTO: 4.44 M/UL (ref 4.35–5.65)
SODIUM SERPL-SCNC: 138 MMOL/L (ref 136–145)
WBC # BLD AUTO: 9.7 K/UL (ref 4.6–13.2)

## 2023-06-28 PROCEDURE — 6360000002 HC RX W HCPCS: Performed by: INTERNAL MEDICINE

## 2023-06-28 PROCEDURE — 80048 BASIC METABOLIC PNL TOTAL CA: CPT

## 2023-06-28 PROCEDURE — 6370000000 HC RX 637 (ALT 250 FOR IP): Performed by: INTERNAL MEDICINE

## 2023-06-28 PROCEDURE — 2580000003 HC RX 258: Performed by: INTERNAL MEDICINE

## 2023-06-28 PROCEDURE — 99232 SBSQ HOSP IP/OBS MODERATE 35: CPT | Performed by: STUDENT IN AN ORGANIZED HEALTH CARE EDUCATION/TRAINING PROGRAM

## 2023-06-28 PROCEDURE — 93010 ELECTROCARDIOGRAM REPORT: CPT | Performed by: INTERNAL MEDICINE

## 2023-06-28 PROCEDURE — 94761 N-INVAS EAR/PLS OXIMETRY MLT: CPT

## 2023-06-28 PROCEDURE — 85025 COMPLETE CBC W/AUTO DIFF WBC: CPT

## 2023-06-28 PROCEDURE — 85610 PROTHROMBIN TIME: CPT

## 2023-06-28 PROCEDURE — 36415 COLL VENOUS BLD VENIPUNCTURE: CPT

## 2023-06-28 PROCEDURE — 1100000000 HC RM PRIVATE

## 2023-06-28 RX ORDER — QUETIAPINE FUMARATE 200 MG/1
200 TABLET, FILM COATED ORAL NIGHTLY
COMMUNITY

## 2023-06-28 RX ADMIN — QUETIAPINE FUMARATE 200 MG: 200 TABLET ORAL at 20:33

## 2023-06-28 RX ADMIN — CEFTRIAXONE 1000 MG: 1 INJECTION, POWDER, FOR SOLUTION INTRAMUSCULAR; INTRAVENOUS at 21:12

## 2023-06-28 RX ADMIN — SODIUM CHLORIDE, SODIUM LACTATE, POTASSIUM CHLORIDE, AND CALCIUM CHLORIDE: 600; 310; 30; 20 INJECTION, SOLUTION INTRAVENOUS at 09:31

## 2023-06-29 ENCOUNTER — APPOINTMENT (OUTPATIENT)
Facility: HOSPITAL | Age: 63
DRG: 357 | End: 2023-06-29
Payer: MEDICARE

## 2023-06-29 LAB
ALBUMIN SERPL-MCNC: 2.6 G/DL (ref 3.4–5)
ALBUMIN/GLOB SERPL: 0.8 (ref 0.8–1.7)
ALP SERPL-CCNC: 65 U/L (ref 45–117)
ALT SERPL-CCNC: 21 U/L (ref 16–61)
ANION GAP SERPL CALC-SCNC: 4 MMOL/L (ref 3–18)
AST SERPL-CCNC: 10 U/L (ref 10–38)
BASOPHILS # BLD: 0 K/UL (ref 0–0.1)
BASOPHILS NFR BLD: 0 % (ref 0–2)
BILIRUB SERPL-MCNC: 0.8 MG/DL (ref 0.2–1)
BUN SERPL-MCNC: 15 MG/DL (ref 7–18)
BUN/CREAT SERPL: 27 (ref 12–20)
CALCIUM SERPL-MCNC: 9.1 MG/DL (ref 8.5–10.1)
CHLORIDE SERPL-SCNC: 106 MMOL/L (ref 100–111)
CO2 SERPL-SCNC: 30 MMOL/L (ref 21–32)
CREAT SERPL-MCNC: 0.56 MG/DL (ref 0.6–1.3)
DIFFERENTIAL METHOD BLD: ABNORMAL
EOSINOPHIL # BLD: 0.3 K/UL (ref 0–0.4)
EOSINOPHIL NFR BLD: 5 % (ref 0–5)
ERYTHROCYTE [DISTWIDTH] IN BLOOD BY AUTOMATED COUNT: 11.9 % (ref 11.6–14.5)
GLOBULIN SER CALC-MCNC: 3.2 G/DL (ref 2–4)
GLUCOSE SERPL-MCNC: 89 MG/DL (ref 74–99)
HCT VFR BLD AUTO: 38.2 % (ref 36–48)
HGB BLD-MCNC: 12.5 G/DL (ref 13–16)
IMM GRANULOCYTES # BLD AUTO: 0 K/UL (ref 0–0.04)
IMM GRANULOCYTES NFR BLD AUTO: 1 % (ref 0–0.5)
LYMPHOCYTES # BLD: 1.7 K/UL (ref 0.9–3.6)
LYMPHOCYTES NFR BLD: 26 % (ref 21–52)
MCH RBC QN AUTO: 30 PG (ref 24–34)
MCHC RBC AUTO-ENTMCNC: 32.7 G/DL (ref 31–37)
MCV RBC AUTO: 91.6 FL (ref 78–100)
MONOCYTES # BLD: 0.9 K/UL (ref 0.05–1.2)
MONOCYTES NFR BLD: 13 % (ref 3–10)
NEUTS SEG # BLD: 3.6 K/UL (ref 1.8–8)
NEUTS SEG NFR BLD: 55 % (ref 40–73)
NRBC # BLD: 0 K/UL (ref 0–0.01)
NRBC BLD-RTO: 0 PER 100 WBC
PLATELET # BLD AUTO: 152 K/UL (ref 135–420)
PMV BLD AUTO: 10.1 FL (ref 9.2–11.8)
POTASSIUM SERPL-SCNC: 3.5 MMOL/L (ref 3.5–5.5)
PROT SERPL-MCNC: 5.8 G/DL (ref 6.4–8.2)
RBC # BLD AUTO: 4.17 M/UL (ref 4.35–5.65)
SODIUM SERPL-SCNC: 140 MMOL/L (ref 136–145)
WBC # BLD AUTO: 6.5 K/UL (ref 4.6–13.2)

## 2023-06-29 PROCEDURE — 2580000003 HC RX 258: Performed by: INTERNAL MEDICINE

## 2023-06-29 PROCEDURE — 36415 COLL VENOUS BLD VENIPUNCTURE: CPT

## 2023-06-29 PROCEDURE — 85025 COMPLETE CBC W/AUTO DIFF WBC: CPT

## 2023-06-29 PROCEDURE — 6370000000 HC RX 637 (ALT 250 FOR IP): Performed by: INTERNAL MEDICINE

## 2023-06-29 PROCEDURE — 87086 URINE CULTURE/COLONY COUNT: CPT

## 2023-06-29 PROCEDURE — 71260 CT THORAX DX C+: CPT

## 2023-06-29 PROCEDURE — 1100000000 HC RM PRIVATE

## 2023-06-29 PROCEDURE — 6360000004 HC RX CONTRAST MEDICATION: Performed by: INTERNAL MEDICINE

## 2023-06-29 PROCEDURE — 6360000002 HC RX W HCPCS: Performed by: INTERNAL MEDICINE

## 2023-06-29 PROCEDURE — 80053 COMPREHEN METABOLIC PANEL: CPT

## 2023-06-29 PROCEDURE — 99232 SBSQ HOSP IP/OBS MODERATE 35: CPT | Performed by: STUDENT IN AN ORGANIZED HEALTH CARE EDUCATION/TRAINING PROGRAM

## 2023-06-29 RX ADMIN — SODIUM CHLORIDE, PRESERVATIVE FREE 10 ML: 5 INJECTION INTRAVENOUS at 21:05

## 2023-06-29 RX ADMIN — QUETIAPINE FUMARATE 200 MG: 200 TABLET ORAL at 21:05

## 2023-06-29 RX ADMIN — CEFTRIAXONE 1000 MG: 1 INJECTION, POWDER, FOR SOLUTION INTRAMUSCULAR; INTRAVENOUS at 21:05

## 2023-06-29 RX ADMIN — IOPAMIDOL 70 ML: 612 INJECTION, SOLUTION INTRAVENOUS at 09:21

## 2023-06-29 RX ADMIN — SODIUM CHLORIDE, SODIUM LACTATE, POTASSIUM CHLORIDE, AND CALCIUM CHLORIDE: 600; 310; 30; 20 INJECTION, SOLUTION INTRAVENOUS at 06:50

## 2023-06-30 ENCOUNTER — ANESTHESIA EVENT (OUTPATIENT)
Facility: HOSPITAL | Age: 63
End: 2023-06-30
Payer: MEDICARE

## 2023-06-30 ENCOUNTER — ANESTHESIA (OUTPATIENT)
Facility: HOSPITAL | Age: 63
End: 2023-06-30
Payer: MEDICARE

## 2023-06-30 ENCOUNTER — APPOINTMENT (OUTPATIENT)
Facility: HOSPITAL | Age: 63
DRG: 357 | End: 2023-06-30
Payer: MEDICARE

## 2023-06-30 LAB
ALBUMIN SERPL-MCNC: 2.9 G/DL (ref 3.4–5)
ALBUMIN/GLOB SERPL: 0.9 (ref 0.8–1.7)
ALP SERPL-CCNC: 70 U/L (ref 45–117)
ALT SERPL-CCNC: 25 U/L (ref 16–61)
ANION GAP SERPL CALC-SCNC: 3 MMOL/L (ref 3–18)
AST SERPL-CCNC: 11 U/L (ref 10–38)
BACTERIA SPEC CULT: NORMAL
BASOPHILS # BLD: 0 K/UL (ref 0–0.1)
BASOPHILS NFR BLD: 0 % (ref 0–2)
BILIRUB SERPL-MCNC: 0.7 MG/DL (ref 0.2–1)
BUN SERPL-MCNC: 13 MG/DL (ref 7–18)
BUN/CREAT SERPL: 23 (ref 12–20)
CALCIUM SERPL-MCNC: 9.5 MG/DL (ref 8.5–10.1)
CHLORIDE SERPL-SCNC: 104 MMOL/L (ref 100–111)
CO2 SERPL-SCNC: 31 MMOL/L (ref 21–32)
CREAT SERPL-MCNC: 0.56 MG/DL (ref 0.6–1.3)
DIFFERENTIAL METHOD BLD: ABNORMAL
EOSINOPHIL # BLD: 0.4 K/UL (ref 0–0.4)
EOSINOPHIL NFR BLD: 7 % (ref 0–5)
ERYTHROCYTE [DISTWIDTH] IN BLOOD BY AUTOMATED COUNT: 11.8 % (ref 11.6–14.5)
GLOBULIN SER CALC-MCNC: 3.4 G/DL (ref 2–4)
GLUCOSE SERPL-MCNC: 99 MG/DL (ref 74–99)
HCT VFR BLD AUTO: 39.1 % (ref 36–48)
HGB BLD-MCNC: 13 G/DL (ref 13–16)
IMM GRANULOCYTES # BLD AUTO: 0 K/UL (ref 0–0.04)
IMM GRANULOCYTES NFR BLD AUTO: 1 % (ref 0–0.5)
LYMPHOCYTES # BLD: 1.4 K/UL (ref 0.9–3.6)
LYMPHOCYTES NFR BLD: 25 % (ref 21–52)
MCH RBC QN AUTO: 30.4 PG (ref 24–34)
MCHC RBC AUTO-ENTMCNC: 33.2 G/DL (ref 31–37)
MCV RBC AUTO: 91.4 FL (ref 78–100)
MONOCYTES # BLD: 0.5 K/UL (ref 0.05–1.2)
MONOCYTES NFR BLD: 9 % (ref 3–10)
NEUTS SEG # BLD: 3.3 K/UL (ref 1.8–8)
NEUTS SEG NFR BLD: 59 % (ref 40–73)
NRBC # BLD: 0 K/UL (ref 0–0.01)
NRBC BLD-RTO: 0 PER 100 WBC
PLATELET # BLD AUTO: 159 K/UL (ref 135–420)
PMV BLD AUTO: 10.3 FL (ref 9.2–11.8)
POTASSIUM SERPL-SCNC: 3.5 MMOL/L (ref 3.5–5.5)
PROT SERPL-MCNC: 6.3 G/DL (ref 6.4–8.2)
RBC # BLD AUTO: 4.28 M/UL (ref 4.35–5.65)
SERVICE CMNT-IMP: NORMAL
SODIUM SERPL-SCNC: 138 MMOL/L (ref 136–145)
WBC # BLD AUTO: 5.7 K/UL (ref 4.6–13.2)

## 2023-06-30 PROCEDURE — 7100000001 HC PACU RECOVERY - ADDTL 15 MIN

## 2023-06-30 PROCEDURE — 6360000002 HC RX W HCPCS: Performed by: NURSE ANESTHETIST, CERTIFIED REGISTERED

## 2023-06-30 PROCEDURE — 80053 COMPREHEN METABOLIC PANEL: CPT

## 2023-06-30 PROCEDURE — 3700000000 HC ANESTHESIA ATTENDED CARE

## 2023-06-30 PROCEDURE — 88342 IMHCHEM/IMCYTCHM 1ST ANTB: CPT

## 2023-06-30 PROCEDURE — 88341 IMHCHEM/IMCYTCHM EA ADD ANTB: CPT

## 2023-06-30 PROCEDURE — 99232 SBSQ HOSP IP/OBS MODERATE 35: CPT | Performed by: STUDENT IN AN ORGANIZED HEALTH CARE EDUCATION/TRAINING PROGRAM

## 2023-06-30 PROCEDURE — 88305 TISSUE EXAM BY PATHOLOGIST: CPT

## 2023-06-30 PROCEDURE — 07BC3ZX EXCISION OF PELVIS LYMPHATIC, PERCUTANEOUS APPROACH, DIAGNOSTIC: ICD-10-PCS | Performed by: RADIOLOGY

## 2023-06-30 PROCEDURE — 36415 COLL VENOUS BLD VENIPUNCTURE: CPT

## 2023-06-30 PROCEDURE — 1100000000 HC RM PRIVATE

## 2023-06-30 PROCEDURE — 88333 PATH CONSLTJ SURG CYTO XM 1: CPT

## 2023-06-30 PROCEDURE — 3700000001 HC ADD 15 MINUTES (ANESTHESIA)

## 2023-06-30 PROCEDURE — 7100000000 HC PACU RECOVERY - FIRST 15 MIN

## 2023-06-30 PROCEDURE — 2580000003 HC RX 258: Performed by: INTERNAL MEDICINE

## 2023-06-30 PROCEDURE — 2580000003 HC RX 258: Performed by: NURSE ANESTHETIST, CERTIFIED REGISTERED

## 2023-06-30 PROCEDURE — 85025 COMPLETE CBC W/AUTO DIFF WBC: CPT

## 2023-06-30 PROCEDURE — 88334 PATH CONSLTJ SURG CYTO XM EA: CPT

## 2023-06-30 PROCEDURE — 6360000002 HC RX W HCPCS: Performed by: INTERNAL MEDICINE

## 2023-06-30 PROCEDURE — 6370000000 HC RX 637 (ALT 250 FOR IP): Performed by: INTERNAL MEDICINE

## 2023-06-30 PROCEDURE — 99153 MOD SED SAME PHYS/QHP EA: CPT

## 2023-06-30 RX ORDER — DIPHENHYDRAMINE HYDROCHLORIDE 50 MG/ML
12.5 INJECTION INTRAMUSCULAR; INTRAVENOUS
Status: DISCONTINUED | OUTPATIENT
Start: 2023-06-30 | End: 2023-06-30 | Stop reason: ALTCHOICE

## 2023-06-30 RX ORDER — FENTANYL CITRATE 50 UG/ML
25 INJECTION, SOLUTION INTRAMUSCULAR; INTRAVENOUS EVERY 5 MIN PRN
Status: DISCONTINUED | OUTPATIENT
Start: 2023-06-30 | End: 2023-06-30 | Stop reason: ALTCHOICE

## 2023-06-30 RX ORDER — PROCHLORPERAZINE EDISYLATE 5 MG/ML
5 INJECTION INTRAMUSCULAR; INTRAVENOUS
Status: DISCONTINUED | OUTPATIENT
Start: 2023-06-30 | End: 2023-06-30 | Stop reason: ALTCHOICE

## 2023-06-30 RX ORDER — SODIUM CHLORIDE 0.9 % (FLUSH) 0.9 %
5-40 SYRINGE (ML) INJECTION EVERY 12 HOURS SCHEDULED
Status: DISCONTINUED | OUTPATIENT
Start: 2023-06-30 | End: 2023-06-30 | Stop reason: ALTCHOICE

## 2023-06-30 RX ORDER — FENTANYL CITRATE 50 UG/ML
INJECTION, SOLUTION INTRAMUSCULAR; INTRAVENOUS PRN
Status: DISCONTINUED | OUTPATIENT
Start: 2023-06-30 | End: 2023-06-30 | Stop reason: SDUPTHER

## 2023-06-30 RX ORDER — ACETAMINOPHEN 325 MG/1
650 TABLET ORAL
Status: ACTIVE | OUTPATIENT
Start: 2023-06-30 | End: 2023-07-01

## 2023-06-30 RX ORDER — SODIUM CHLORIDE 0.9 % (FLUSH) 0.9 %
5-40 SYRINGE (ML) INJECTION PRN
Status: DISCONTINUED | OUTPATIENT
Start: 2023-06-30 | End: 2023-06-30 | Stop reason: ALTCHOICE

## 2023-06-30 RX ORDER — SODIUM CHLORIDE, SODIUM LACTATE, POTASSIUM CHLORIDE, CALCIUM CHLORIDE 600; 310; 30; 20 MG/100ML; MG/100ML; MG/100ML; MG/100ML
INJECTION, SOLUTION INTRAVENOUS CONTINUOUS
Status: DISCONTINUED | OUTPATIENT
Start: 2023-06-30 | End: 2023-06-30 | Stop reason: ALTCHOICE

## 2023-06-30 RX ORDER — ONDANSETRON 2 MG/ML
4 INJECTION INTRAMUSCULAR; INTRAVENOUS
Status: DISCONTINUED | OUTPATIENT
Start: 2023-06-30 | End: 2023-06-30 | Stop reason: ALTCHOICE

## 2023-06-30 RX ORDER — MIDAZOLAM HYDROCHLORIDE 1 MG/ML
INJECTION INTRAMUSCULAR; INTRAVENOUS PRN
Status: DISCONTINUED | OUTPATIENT
Start: 2023-06-30 | End: 2023-06-30 | Stop reason: SDUPTHER

## 2023-06-30 RX ORDER — LIDOCAINE HYDROCHLORIDE 10 MG/ML
30 INJECTION, SOLUTION EPIDURAL; INFILTRATION; INTRACAUDAL; PERINEURAL ONCE
Status: DISCONTINUED | OUTPATIENT
Start: 2023-06-30 | End: 2023-06-30 | Stop reason: ALTCHOICE

## 2023-06-30 RX ORDER — SODIUM CHLORIDE 9 MG/ML
INJECTION, SOLUTION INTRAVENOUS PRN
Status: DISCONTINUED | OUTPATIENT
Start: 2023-06-30 | End: 2023-07-01 | Stop reason: HOSPADM

## 2023-06-30 RX ORDER — PROPOFOL 10 MG/ML
INJECTION, EMULSION INTRAVENOUS CONTINUOUS PRN
Status: DISCONTINUED | OUTPATIENT
Start: 2023-06-30 | End: 2023-06-30 | Stop reason: SDUPTHER

## 2023-06-30 RX ADMIN — MIDAZOLAM 2 MG: 1 INJECTION, SOLUTION INTRAMUSCULAR; INTRAVENOUS at 10:55

## 2023-06-30 RX ADMIN — SODIUM CHLORIDE, PRESERVATIVE FREE 10 ML: 5 INJECTION INTRAVENOUS at 08:31

## 2023-06-30 RX ADMIN — QUETIAPINE FUMARATE 200 MG: 200 TABLET ORAL at 21:19

## 2023-06-30 RX ADMIN — SODIUM CHLORIDE, PRESERVATIVE FREE 10 ML: 5 INJECTION INTRAVENOUS at 21:20

## 2023-06-30 RX ADMIN — SODIUM CHLORIDE, SODIUM LACTATE, POTASSIUM CHLORIDE, AND CALCIUM CHLORIDE: 600; 310; 30; 20 INJECTION, SOLUTION INTRAVENOUS at 17:48

## 2023-06-30 RX ADMIN — FENTANYL CITRATE 50 MCG: 50 INJECTION INTRAMUSCULAR; INTRAVENOUS at 11:20

## 2023-06-30 RX ADMIN — CEFTRIAXONE 1000 MG: 1 INJECTION, POWDER, FOR SOLUTION INTRAMUSCULAR; INTRAVENOUS at 21:19

## 2023-06-30 RX ADMIN — SODIUM CHLORIDE, POTASSIUM CHLORIDE, SODIUM LACTATE AND CALCIUM CHLORIDE: 600; 310; 30; 20 INJECTION, SOLUTION INTRAVENOUS at 13:31

## 2023-06-30 RX ADMIN — FENTANYL CITRATE 50 MCG: 50 INJECTION INTRAMUSCULAR; INTRAVENOUS at 11:06

## 2023-06-30 RX ADMIN — PROPOFOL 140 MCG/KG/MIN: 10 INJECTION, EMULSION INTRAVENOUS at 11:06

## 2023-06-30 ASSESSMENT — PAIN SCALES - GENERAL
PAINLEVEL_OUTOF10: 0

## 2023-07-01 VITALS
RESPIRATION RATE: 18 BRPM | OXYGEN SATURATION: 97 % | HEART RATE: 69 BPM | BODY MASS INDEX: 24.65 KG/M2 | HEIGHT: 72 IN | WEIGHT: 182 LBS | SYSTOLIC BLOOD PRESSURE: 122 MMHG | DIASTOLIC BLOOD PRESSURE: 82 MMHG | TEMPERATURE: 97.1 F

## 2023-07-01 LAB
ALBUMIN SERPL-MCNC: 2.7 G/DL (ref 3.4–5)
ALBUMIN/GLOB SERPL: 0.9 (ref 0.8–1.7)
ALP SERPL-CCNC: 65 U/L (ref 45–117)
ALT SERPL-CCNC: 22 U/L (ref 16–61)
ANION GAP SERPL CALC-SCNC: 4 MMOL/L (ref 3–18)
AST SERPL-CCNC: 13 U/L (ref 10–38)
BASOPHILS # BLD: 0 K/UL (ref 0–0.1)
BASOPHILS NFR BLD: 0 % (ref 0–2)
BILIRUB SERPL-MCNC: 0.8 MG/DL (ref 0.2–1)
BUN SERPL-MCNC: 12 MG/DL (ref 7–18)
BUN/CREAT SERPL: 23 (ref 12–20)
CALCIUM SERPL-MCNC: 9 MG/DL (ref 8.5–10.1)
CHLORIDE SERPL-SCNC: 105 MMOL/L (ref 100–111)
CO2 SERPL-SCNC: 28 MMOL/L (ref 21–32)
CREAT SERPL-MCNC: 0.53 MG/DL (ref 0.6–1.3)
DIFFERENTIAL METHOD BLD: ABNORMAL
EOSINOPHIL # BLD: 0.5 K/UL (ref 0–0.4)
EOSINOPHIL NFR BLD: 8 % (ref 0–5)
ERYTHROCYTE [DISTWIDTH] IN BLOOD BY AUTOMATED COUNT: 11.9 % (ref 11.6–14.5)
GLOBULIN SER CALC-MCNC: 3.1 G/DL (ref 2–4)
GLUCOSE SERPL-MCNC: 115 MG/DL (ref 74–99)
HCT VFR BLD AUTO: 36.6 % (ref 36–48)
HGB BLD-MCNC: 12 G/DL (ref 13–16)
IMM GRANULOCYTES # BLD AUTO: 0 K/UL (ref 0–0.04)
IMM GRANULOCYTES NFR BLD AUTO: 1 % (ref 0–0.5)
LYMPHOCYTES # BLD: 1.7 K/UL (ref 0.9–3.6)
LYMPHOCYTES NFR BLD: 28 % (ref 21–52)
MCH RBC QN AUTO: 29.7 PG (ref 24–34)
MCHC RBC AUTO-ENTMCNC: 32.8 G/DL (ref 31–37)
MCV RBC AUTO: 90.6 FL (ref 78–100)
MONOCYTES # BLD: 0.6 K/UL (ref 0.05–1.2)
MONOCYTES NFR BLD: 10 % (ref 3–10)
NEUTS SEG # BLD: 3.2 K/UL (ref 1.8–8)
NEUTS SEG NFR BLD: 54 % (ref 40–73)
NRBC # BLD: 0 K/UL (ref 0–0.01)
NRBC BLD-RTO: 0 PER 100 WBC
PLATELET # BLD AUTO: 151 K/UL (ref 135–420)
PMV BLD AUTO: 10.5 FL (ref 9.2–11.8)
POTASSIUM SERPL-SCNC: 3.7 MMOL/L (ref 3.5–5.5)
PROT SERPL-MCNC: 5.8 G/DL (ref 6.4–8.2)
RBC # BLD AUTO: 4.04 M/UL (ref 4.35–5.65)
SODIUM SERPL-SCNC: 137 MMOL/L (ref 136–145)
WBC # BLD AUTO: 6 K/UL (ref 4.6–13.2)

## 2023-07-01 PROCEDURE — 80053 COMPREHEN METABOLIC PANEL: CPT

## 2023-07-01 PROCEDURE — 2580000003 HC RX 258: Performed by: INTERNAL MEDICINE

## 2023-07-01 PROCEDURE — 99234 HOSP IP/OBS SM DT SF/LOW 45: CPT | Performed by: STUDENT IN AN ORGANIZED HEALTH CARE EDUCATION/TRAINING PROGRAM

## 2023-07-01 PROCEDURE — 36415 COLL VENOUS BLD VENIPUNCTURE: CPT

## 2023-07-01 PROCEDURE — 85025 COMPLETE CBC W/AUTO DIFF WBC: CPT

## 2023-07-01 RX ORDER — OXYCODONE HYDROCHLORIDE AND ACETAMINOPHEN 5; 325 MG/1; MG/1
1 TABLET ORAL EVERY 6 HOURS PRN
Qty: 12 TABLET | Refills: 0 | Status: SHIPPED | OUTPATIENT
Start: 2023-07-01 | End: 2023-07-04

## 2023-07-01 RX ORDER — ONDANSETRON 4 MG/1
4 TABLET, ORALLY DISINTEGRATING ORAL 3 TIMES DAILY PRN
Qty: 21 TABLET | Refills: 0 | Status: SHIPPED | OUTPATIENT
Start: 2023-07-01 | End: 2023-07-05

## 2023-07-01 RX ADMIN — SODIUM CHLORIDE, SODIUM LACTATE, POTASSIUM CHLORIDE, AND CALCIUM CHLORIDE: 600; 310; 30; 20 INJECTION, SOLUTION INTRAVENOUS at 07:40

## 2023-07-01 ASSESSMENT — PAIN SCALES - GENERAL: PAINLEVEL_OUTOF10: 0

## 2023-07-02 LAB
BACTERIA SPEC CULT: NORMAL
BACTERIA SPEC CULT: NORMAL
SERVICE CMNT-IMP: NORMAL
SERVICE CMNT-IMP: NORMAL

## 2023-08-02 ENCOUNTER — HOSPITAL ENCOUNTER (OUTPATIENT)
Facility: HOSPITAL | Age: 63
Discharge: HOME | End: 2023-08-05
Payer: COMMERCIAL

## 2023-08-02 DIAGNOSIS — C61 PROSTATE CANCER (HCC): ICD-10-CM

## 2023-08-02 DIAGNOSIS — R19.00 PELVIC MASS: ICD-10-CM

## 2023-08-02 DIAGNOSIS — N21.0 BLADDER STONE: ICD-10-CM

## 2023-08-02 DIAGNOSIS — N13.30 HYDRONEPHROSIS, UNSPECIFIED HYDRONEPHROSIS TYPE: ICD-10-CM

## 2023-08-02 PROCEDURE — 78306 BONE IMAGING WHOLE BODY: CPT

## 2023-08-02 PROCEDURE — 3430000000 HC RX DIAGNOSTIC RADIOPHARMACEUTICAL

## 2023-08-02 PROCEDURE — 76770 US EXAM ABDO BACK WALL COMP: CPT

## 2023-08-02 PROCEDURE — A9503 TC99M MEDRONATE: HCPCS

## 2023-08-02 RX ORDER — TC 99M MEDRONATE 20 MG/10ML
25.6 INJECTION, POWDER, LYOPHILIZED, FOR SOLUTION INTRAVENOUS
Status: COMPLETED | OUTPATIENT
Start: 2023-08-02 | End: 2023-08-02

## 2023-08-02 RX ADMIN — TC 99M MEDRONATE 25.6 MILLICURIE: 20 INJECTION, POWDER, LYOPHILIZED, FOR SOLUTION INTRAVENOUS at 12:53

## 2023-08-09 PROBLEM — Z77.22 SECOND HAND SMOKE EXPOSURE: Status: ACTIVE | Noted: 2023-08-09

## 2023-08-09 PROBLEM — F51.01 PRIMARY INSOMNIA: Status: ACTIVE | Noted: 2023-08-09

## 2023-08-09 PROBLEM — S06.33AA INTRAPARENCHYMAL HEMATOMA OF BRAIN DUE TO TRAUMA (HCC): Status: ACTIVE | Noted: 2018-09-06

## 2023-08-09 PROBLEM — I26.99 PULMONARY EMBOLISM (HCC): Status: ACTIVE | Noted: 2018-09-01

## 2023-08-09 PROBLEM — F03.90 MAJOR NEUROCOGNITIVE DISORDER (HCC): Status: ACTIVE | Noted: 2023-08-09

## 2023-08-09 PROBLEM — F09 UNSPECIFIED MENTAL DISORDER DUE TO KNOWN PHYSIOLOGICAL CONDITION: Status: ACTIVE | Noted: 2023-08-09

## 2023-08-09 PROBLEM — S06.9X9S UNSPECIFIED INTRACRANIAL INJURY WITH LOSS OF CONSCIOUSNESS OF UNSPECIFIED DURATION, SEQUELA (HCC): Status: ACTIVE | Noted: 2018-09-01

## 2023-08-09 PROBLEM — S06.2XAA BRAIN CONTUSION (HCC): Status: ACTIVE | Noted: 2018-09-01

## 2023-08-09 PROBLEM — C61 PRIMARY MALIGNANT NEOPLASM OF PROSTATE (HCC): Status: ACTIVE | Noted: 2023-08-09

## 2023-08-09 PROBLEM — I61.1 PUNCTATE HEMORRHAGE OF LEFT FRONTAL LOBE (HCC): Status: ACTIVE | Noted: 2018-09-01

## 2023-08-09 PROBLEM — J44.9 COPD (CHRONIC OBSTRUCTIVE PULMONARY DISEASE) CASE MANAGEMENT PATIENT (HCC): Status: ACTIVE | Noted: 2023-08-09

## 2023-12-05 ENCOUNTER — HOSPITAL ENCOUNTER (OUTPATIENT)
Facility: HOSPITAL | Age: 63
Discharge: HOME | End: 2023-12-08
Payer: COMMERCIAL

## 2023-12-05 DIAGNOSIS — C61 MALIGNANT NEOPLASM OF PROSTATE (HCC): ICD-10-CM

## 2023-12-05 LAB — CREAT UR-MCNC: 0.6 MG/DL (ref 0.6–1.3)

## 2023-12-05 PROCEDURE — 74177 CT ABD & PELVIS W/CONTRAST: CPT

## 2023-12-05 PROCEDURE — 6360000004 HC RX CONTRAST MEDICATION: Performed by: PHYSICIAN ASSISTANT

## 2023-12-05 PROCEDURE — 82565 ASSAY OF CREATININE: CPT

## 2023-12-05 RX ADMIN — DIATRIZOATE MEGLUMINE AND DIATRIZOATE SODIUM 30 ML: 660; 100 LIQUID ORAL; RECTAL at 08:45

## 2023-12-05 RX ADMIN — IOPAMIDOL 100 ML: 612 INJECTION, SOLUTION INTRAVENOUS at 08:45

## 2024-04-11 NOTE — TELEPHONE ENCOUNTER
New prescription sent for Keppra to patient's pharmacy for a total of 30-day supply. Patient will need to follow-up with neurology for further refills, as discussed during last office visit. Continue Regimen: spironolactone 50 mg tablet Take 1 PO BID Modify Regimen: tretinoin 0.025 % topical cream to Tretinoin 0.05% cream Render In Strict Bullet Format?: No Detail Level: Zone

## 2024-12-23 ENCOUNTER — OFFICE VISIT (OUTPATIENT)
Age: 64
End: 2024-12-23
Payer: COMMERCIAL

## 2024-12-23 VITALS
WEIGHT: 209 LBS | BODY MASS INDEX: 28.31 KG/M2 | DIASTOLIC BLOOD PRESSURE: 62 MMHG | SYSTOLIC BLOOD PRESSURE: 100 MMHG | HEIGHT: 72 IN | HEART RATE: 93 BPM

## 2024-12-23 DIAGNOSIS — I10 ESSENTIAL HYPERTENSION WITH GOAL BLOOD PRESSURE LESS THAN 140/90: Primary | ICD-10-CM

## 2024-12-23 DIAGNOSIS — I26.99 OTHER PULMONARY EMBOLISM WITHOUT ACUTE COR PULMONALE, UNSPECIFIED CHRONICITY (HCC): ICD-10-CM

## 2024-12-23 PROCEDURE — 3078F DIAST BP <80 MM HG: CPT | Performed by: INTERNAL MEDICINE

## 2024-12-23 PROCEDURE — 3074F SYST BP LT 130 MM HG: CPT | Performed by: INTERNAL MEDICINE

## 2024-12-23 PROCEDURE — 99214 OFFICE O/P EST MOD 30 MIN: CPT | Performed by: INTERNAL MEDICINE

## 2024-12-23 RX ORDER — APIXABAN 5 MG/1
5 TABLET, FILM COATED ORAL 2 TIMES DAILY
COMMUNITY
Start: 2024-12-11

## 2024-12-23 RX ORDER — ARIPIPRAZOLE 10 MG/1
TABLET ORAL
COMMUNITY

## 2024-12-23 RX ORDER — LISINOPRIL AND HYDROCHLOROTHIAZIDE 10; 12.5 MG/1; MG/1
TABLET ORAL
COMMUNITY
Start: 2024-11-02

## 2024-12-23 NOTE — PROGRESS NOTES
Recommend to continue same.  Salt restriction DASH diet discussed with brother.    Currently patient has not has any send there is any signs of endorgan change in heart failure  Since symptoms discussed with the patient patient and and brother    Importance of diet and exercise was discussed with patient.    This plan was discussed with patient who is in agreement.    Thank you for allowing me to participate in patient care. Please feel free to call me if you have any question or concern.     Anders Judd MD  Please note: This document has been produced using voice recognition software. Unrecognized errors in transcription may be present.

## 2025-06-04 NOTE — TELEPHONE ENCOUNTER
This is a patient of Tatum Mathew. Patient need a medication refill. He has been out of medication for a week. Please advise     Requested Prescriptions     Pending Prescriptions Disp Refills    amitriptyline (ELAVIL) 100 mg tablet 30 Tab 0     Sig: Take 1 Tab by mouth nightly.
Caprini Score - 4  Heparin 5,000SQ ordered prophylaxis for day of surgery